# Patient Record
Sex: MALE | Race: WHITE | Employment: FULL TIME | URBAN - METROPOLITAN AREA
[De-identification: names, ages, dates, MRNs, and addresses within clinical notes are randomized per-mention and may not be internally consistent; named-entity substitution may affect disease eponyms.]

---

## 2017-01-02 RX ORDER — NEBIVOLOL HYDROCHLORIDE 2.5 MG/1
TABLET ORAL
Qty: 90 TABLET | Refills: 1 | Status: SHIPPED | OUTPATIENT
Start: 2017-01-02 | End: 2017-10-28

## 2017-01-20 ENCOUNTER — OFFICE VISIT (OUTPATIENT)
Dept: HEMATOLOGY/ONCOLOGY | Facility: HOSPITAL | Age: 52
End: 2017-01-20
Attending: SPECIALIST
Payer: COMMERCIAL

## 2017-01-20 VITALS
HEART RATE: 71 BPM | BODY MASS INDEX: 23.87 KG/M2 | TEMPERATURE: 98 F | DIASTOLIC BLOOD PRESSURE: 77 MMHG | HEIGHT: 69 IN | WEIGHT: 161.19 LBS | SYSTOLIC BLOOD PRESSURE: 129 MMHG | RESPIRATION RATE: 18 BRPM

## 2017-01-20 DIAGNOSIS — C88.4 EXTRANODAL MARGINAL ZONE B-CELL LYMPHOMA OF MUCOSA-ASSOCIATED LYMPHOID TISSUE (MALT) (HCC): Primary | ICD-10-CM

## 2017-01-20 DIAGNOSIS — K76.9 LESION OF LIVER: ICD-10-CM

## 2017-01-20 DIAGNOSIS — R77.8 ELEVATED TOTAL PROTEIN: ICD-10-CM

## 2017-01-20 DIAGNOSIS — F41.8 ANXIETY ABOUT HEALTH: ICD-10-CM

## 2017-01-20 DIAGNOSIS — R93.2 ABNORMAL LIVER CT: ICD-10-CM

## 2017-01-20 DIAGNOSIS — R74.8 ELEVATED ALKALINE PHOSPHATASE LEVEL: ICD-10-CM

## 2017-01-20 DIAGNOSIS — R16.0 LIVER MASSES: ICD-10-CM

## 2017-01-20 LAB
ALBUMIN SERPL-MCNC: 4.2 G/DL (ref 3.5–4.8)
ALP LIVER SERPL-CCNC: 121 U/L (ref 45–117)
ALT SERPL-CCNC: 44 U/L (ref 17–63)
AST SERPL-CCNC: 33 U/L (ref 15–41)
BASOPHILS # BLD AUTO: 0.02 X10(3) UL (ref 0–0.1)
BASOPHILS NFR BLD AUTO: 0.4 %
BILIRUB SERPL-MCNC: 0.5 MG/DL (ref 0.1–2)
BUN BLD-MCNC: 12 MG/DL (ref 8–20)
CALCIUM BLD-MCNC: 9.5 MG/DL (ref 8.3–10.3)
CHLORIDE: 103 MMOL/L (ref 101–111)
CO2: 31 MMOL/L (ref 22–32)
CREAT BLD-MCNC: 1.06 MG/DL (ref 0.7–1.3)
EOSINOPHIL # BLD AUTO: 0.18 X10(3) UL (ref 0–0.3)
EOSINOPHIL NFR BLD AUTO: 3.9 %
ERYTHROCYTE [DISTWIDTH] IN BLOOD BY AUTOMATED COUNT: 11.4 % (ref 11.5–16)
GLUCOSE BLD-MCNC: 88 MG/DL (ref 70–99)
HCT VFR BLD AUTO: 44.1 % (ref 37–53)
HGB BLD-MCNC: 16 G/DL (ref 13–17)
IMMATURE GRANULOCYTE COUNT: 0 X10(3) UL (ref 0–1)
IMMATURE GRANULOCYTE RATIO %: 0 %
LDH: 172 U/L (ref 84–249)
LYMPHOCYTES # BLD AUTO: 0.9 X10(3) UL (ref 0.9–4)
LYMPHOCYTES NFR BLD AUTO: 19.4 %
M PROTEIN MFR SERPL ELPH: 8.4 G/DL (ref 6.1–8.3)
MCH RBC QN AUTO: 31.7 PG (ref 27–33.2)
MCHC RBC AUTO-ENTMCNC: 36.3 G/DL (ref 31–37)
MCV RBC AUTO: 87.5 FL (ref 80–99)
MONOCYTES # BLD AUTO: 0.65 X10(3) UL (ref 0.1–0.6)
MONOCYTES NFR BLD AUTO: 14 %
NEUTROPHIL ABS PRELIM: 2.9 X10 (3) UL (ref 1.3–6.7)
NEUTROPHILS # BLD AUTO: 2.9 X10(3) UL (ref 1.3–6.7)
NEUTROPHILS NFR BLD AUTO: 62.3 %
PLATELET # BLD AUTO: 222 10(3)UL (ref 150–450)
POTASSIUM SERPL-SCNC: 4.1 MMOL/L (ref 3.6–5.1)
RBC # BLD AUTO: 5.04 X10(6)UL (ref 4.3–5.7)
RED CELL DISTRIBUTION WIDTH-SD: 36.3 FL (ref 35.1–46.3)
SODIUM SERPL-SCNC: 139 MMOL/L (ref 136–144)
WBC # BLD AUTO: 4.7 X10(3) UL (ref 4–13)

## 2017-01-20 PROCEDURE — 99215 OFFICE O/P EST HI 40 MIN: CPT | Performed by: SPECIALIST

## 2017-01-20 NOTE — PROGRESS NOTES
Patient is here for follow up with Bud Almanza for MALT Lymphoma. Patient denies pain. Stated concerned he might have felt a lump on his right check - concerned. Medication list and medical history were reviewed and updated.     Education Record    Learner:

## 2017-01-22 PROBLEM — R74.8 ELEVATED ALKALINE PHOSPHATASE LEVEL: Status: ACTIVE | Noted: 2017-01-22

## 2017-01-22 PROBLEM — R45.89 ANXIETY ABOUT HEALTH: Status: ACTIVE | Noted: 2017-01-22

## 2017-01-22 PROBLEM — F41.8 ANXIETY ABOUT HEALTH: Status: ACTIVE | Noted: 2017-01-22

## 2017-01-22 PROBLEM — R93.2 ABNORMAL LIVER CT: Status: ACTIVE | Noted: 2017-01-22

## 2017-01-22 PROBLEM — R77.8 ELEVATED TOTAL PROTEIN: Status: ACTIVE | Noted: 2017-01-22

## 2017-01-22 NOTE — PROGRESS NOTES
[facility] Progress Note      Patient Name: Keiry Siddiqui   YOB: 1965  Medical Record Number: [id number]  Attending Physician: Violetta Garcia M.D.      Date of Visit: 1/20/2017       Chief Complaint  Extranodal marginal zone B History (historical data, reviewed)  Denies tobacco and illicit drug use; uses alcohol socially.        Current Medications     BYSTOLIC 2.5 MG Oral Tab TAKE 1 TABLET BY MOUTH EVERY DAY Disp: 90 tablet Rfl: 1   Nebivolol HCl 2.5 MG Oral Tab Take 2.5 mg by m extremity edema bilaterally. Integumentary Skin is warm and dry. Neurologic Alert and oriented x 3; motor and sensory grossly intact. Psychiatric Mood and affect appropriate; coherent speech; verbalizes understanding of our discussions today.     Parish Hurtado alkaline phosphatase and total protein: Findings are ultimately non-specific. However, CT imaging 09/2016 did show multiple hypodensities. Recommend repeat imaging to confirm stability of these benign appearing abnormalities.  If CT is stable, will repeat l

## 2017-01-25 ENCOUNTER — HOSPITAL ENCOUNTER (OUTPATIENT)
Dept: CT IMAGING | Age: 52
Discharge: HOME | End: 2017-01-25
Attending: SPECIALIST
Payer: COMMERCIAL

## 2017-01-25 DIAGNOSIS — R16.0 LIVER MASSES: ICD-10-CM

## 2017-01-25 DIAGNOSIS — C88.4 EXTRANODAL MARGINAL ZONE B-CELL LYMPHOMA OF MUCOSA-ASSOCIATED LYMPHOID TISSUE (MALT) (HCC): ICD-10-CM

## 2017-01-25 PROCEDURE — 74178 CT ABD&PLV WO CNTR FLWD CNTR: CPT

## 2017-02-10 ENCOUNTER — OFFICE VISIT (OUTPATIENT)
Dept: FAMILY MEDICINE CLINIC | Facility: CLINIC | Age: 52
End: 2017-02-10

## 2017-02-10 ENCOUNTER — TELEPHONE (OUTPATIENT)
Dept: FAMILY MEDICINE CLINIC | Facility: CLINIC | Age: 52
End: 2017-02-10

## 2017-02-10 VITALS
HEART RATE: 86 BPM | WEIGHT: 161 LBS | DIASTOLIC BLOOD PRESSURE: 80 MMHG | BODY MASS INDEX: 23.85 KG/M2 | OXYGEN SATURATION: 98 % | RESPIRATION RATE: 16 BRPM | SYSTOLIC BLOOD PRESSURE: 128 MMHG | HEIGHT: 69 IN | TEMPERATURE: 99 F

## 2017-02-10 DIAGNOSIS — J01.00 ACUTE NON-RECURRENT MAXILLARY SINUSITIS: Primary | ICD-10-CM

## 2017-02-10 DIAGNOSIS — Z12.11 SCREENING FOR COLON CANCER: ICD-10-CM

## 2017-02-10 PROCEDURE — 99213 OFFICE O/P EST LOW 20 MIN: CPT | Performed by: PHYSICIAN ASSISTANT

## 2017-02-10 RX ORDER — AZITHROMYCIN 250 MG/1
TABLET, FILM COATED ORAL
Qty: 6 TABLET | Refills: 0 | Status: SHIPPED | OUTPATIENT
Start: 2017-02-10 | End: 2017-05-18 | Stop reason: ALTCHOICE

## 2017-02-10 RX ORDER — LORATADINE AND PSEUDOEPHEDRINE 10; 240 MG/1; MG/1
1 TABLET, EXTENDED RELEASE ORAL DAILY
Qty: 90 TABLET | Refills: 0 | Status: SHIPPED | OUTPATIENT
Start: 2017-02-10 | End: 2017-05-11

## 2017-02-10 NOTE — PROGRESS NOTES
CHIEF COMPLAINT:   Patient presents with:  Cough: Pt c/o cough, nasal congestion and sinus pressure X 1 week         HPI:   Jason Ball is a 49year old male who presents for congestion for one week. He admits to runny nose and facial pressure.  Gene Martinez pink and non-inflamed. No erythema of the throat. PND noted. No tonsillar enlargement or exudates   NECK: supple, non-tender. LUNGS: Normal respiratory rate. Normal effort. Dry cough. no wheezing. No rales or crackles. . No decreased BS.    CARDIO: RRR w

## 2017-02-10 NOTE — TELEPHONE ENCOUNTER
I spoke to patient who states he is at [facility] wanting to  his prescriptions. States all meds were sent to [facility] initially, but now he has them at [facility]. He is missing his claritin D and a cough medication.  Advised that Karine's note does not

## 2017-02-13 ENCOUNTER — APPOINTMENT (OUTPATIENT)
Dept: HEMATOLOGY/ONCOLOGY | Facility: HOSPITAL | Age: 52
End: 2017-02-13
Attending: SPECIALIST
Payer: COMMERCIAL

## 2017-04-10 ENCOUNTER — TELEPHONE (OUTPATIENT)
Dept: FAMILY MEDICINE CLINIC | Facility: CLINIC | Age: 52
End: 2017-04-10

## 2017-04-10 NOTE — TELEPHONE ENCOUNTER
Pt notified of Pennsaid samples, pt declines samples. He states he is requesting Voltaren gel Rx, he states it works well, he scared to try something new, & he can not get to the office for samples. Please advise on Voltaren Rx.

## 2017-04-10 NOTE — TELEPHONE ENCOUNTER
Pt requesting an Rx for Voltaren gel. He states Dr. Kunal Saldivar gave him a prescription years ago when he hurt his Rt wrist, he states his wrist has been bothering him a lot with the weather changing & he is asking if you would send a refill.   I do not see pre

## 2017-04-11 NOTE — TELEPHONE ENCOUNTER
pennsaid is voltaren gel YESICA, I sent over another voltaren gel which may be what he's been given before, but I don't see a rx in the past.

## 2017-05-16 ENCOUNTER — TELEPHONE (OUTPATIENT)
Dept: HEMATOLOGY/ONCOLOGY | Facility: HOSPITAL | Age: 52
End: 2017-05-16

## 2017-05-17 NOTE — TELEPHONE ENCOUNTER
He's supposed to follow up every 6 months. He was last seen in January so he should come in July.               ----- Message -----          From: Valerie Sanchez RN          Sent: 5/16/2017   1:32 PM            To:  MD Tammy Ragland

## 2017-05-18 ENCOUNTER — TELEPHONE (OUTPATIENT)
Dept: FAMILY MEDICINE CLINIC | Facility: CLINIC | Age: 52
End: 2017-05-18

## 2017-05-18 NOTE — PROGRESS NOTES
Elver Duncan is a 47year old male. Patient presents with: Anxiety: Pt wants to discuss anxiety medications      HPI:   Pt complains of symptoms of anxiety.  He has been having increased anxiety over the past year since diagnosed with non hodgkin' (36.9 °C) (Oral)  Resp 16  Wt 158 lb  SpO2 98%  GENERAL: well developed, well nourished,in no apparent distress  SKIN: no rashes,no suspicious lesions  HEENT: atraumatic, normocephalic  NECK: supple  LUNGS: clear to auscultation  CARDIO: RRR without murmur

## 2017-05-19 ENCOUNTER — TELEPHONE (OUTPATIENT)
Dept: FAMILY MEDICINE CLINIC | Facility: CLINIC | Age: 52
End: 2017-05-19

## 2017-05-19 RX ORDER — CLONAZEPAM 0.5 MG/1
0.25 TABLET ORAL 2 TIMES DAILY PRN
Qty: 30 TABLET | Refills: 0 | COMMUNITY
Start: 2017-05-19 | End: 2017-06-19

## 2017-05-19 NOTE — TELEPHONE ENCOUNTER
Pt taking clonazePAM 0.25 MG Oral Tablet Dispersible which dissolves on the tongue. Pt states he does not produce saliva so unable to take as directed. Patient had panic attack this morning and had to drink water and swallow the pill whole.  Patient is conc

## 2017-05-19 NOTE — TELEPHONE ENCOUNTER
Spoke to pharmacist at [facility]. Clonazepam does not come in 0.25 mg tabs. Smallest dose is 0.5 mg. Phoned in script for clonazepam 0.5 mg with directions to take 1/2 tablet po bid prn for anxiety #30 no refills.  Pharmacy will contact patient that script

## 2017-06-09 RX ORDER — LORATADINE AND PSEUDOEPHEDRINE SULFATE 10; 240 MG/1; MG/1
TABLET, EXTENDED RELEASE ORAL
Qty: 90 TABLET | Refills: 0 | Status: SHIPPED | OUTPATIENT
Start: 2017-06-09 | End: 2017-06-09

## 2017-06-09 RX ORDER — LORATADINE AND PSEUDOEPHEDRINE 10; 240 MG/1; MG/1
1 TABLET, EXTENDED RELEASE ORAL
Qty: 90 TABLET | Refills: 0 | Status: SHIPPED
Start: 2017-06-09 | End: 2017-09-08

## 2017-06-14 RX ORDER — ESCITALOPRAM OXALATE 10 MG/1
TABLET ORAL
Qty: 30 TABLET | Refills: 0 | Status: SHIPPED | OUTPATIENT
Start: 2017-06-14 | End: 2017-07-10 | Stop reason: DRUGHIGH

## 2017-06-14 NOTE — TELEPHONE ENCOUNTER
LOV: 5/18/17    LF: 5/18/17    Pt was to follow up in a month.    Please approve or deny rx refill request.  Thank you

## 2017-06-15 ENCOUNTER — TELEPHONE (OUTPATIENT)
Dept: FAMILY MEDICINE CLINIC | Facility: CLINIC | Age: 52
End: 2017-06-15

## 2017-06-19 ENCOUNTER — LAB ENCOUNTER (OUTPATIENT)
Dept: LAB | Age: 52
End: 2017-06-19
Attending: PHYSICIAN ASSISTANT
Payer: COMMERCIAL

## 2017-06-19 DIAGNOSIS — F41.8 ANXIETY ABOUT HEALTH: ICD-10-CM

## 2017-06-19 DIAGNOSIS — R79.89 ABNORMAL LIVER FUNCTION TESTS: ICD-10-CM

## 2017-06-19 DIAGNOSIS — C88.4 EXTRANODAL MARGINAL ZONE B-CELL LYMPHOMA OF MUCOSA-ASSOCIATED LYMPHOID TISSUE (MALT) (HCC): ICD-10-CM

## 2017-06-19 PROCEDURE — 36415 COLL VENOUS BLD VENIPUNCTURE: CPT | Performed by: PHYSICIAN ASSISTANT

## 2017-06-19 PROCEDURE — 84443 ASSAY THYROID STIM HORMONE: CPT | Performed by: PHYSICIAN ASSISTANT

## 2017-06-19 PROCEDURE — 85025 COMPLETE CBC W/AUTO DIFF WBC: CPT | Performed by: PHYSICIAN ASSISTANT

## 2017-06-19 PROCEDURE — 83615 LACTATE (LD) (LDH) ENZYME: CPT | Performed by: PHYSICIAN ASSISTANT

## 2017-06-19 NOTE — PROGRESS NOTES
Tobi Vargas is a 47year old male. Patient presents with:  Medication Follow-Up      HPI:   Pt presents to follow up on anxiety. He has anxiety and panic attacks related to his health-history of lymphoma.  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

## 2017-07-10 ENCOUNTER — OFFICE VISIT (OUTPATIENT)
Dept: HEMATOLOGY/ONCOLOGY | Facility: HOSPITAL | Age: 52
End: 2017-07-10
Attending: SPECIALIST
Payer: COMMERCIAL

## 2017-07-10 VITALS
HEART RATE: 79 BPM | TEMPERATURE: 98 F | WEIGHT: 158.38 LBS | BODY MASS INDEX: 23.46 KG/M2 | SYSTOLIC BLOOD PRESSURE: 117 MMHG | HEIGHT: 69.02 IN | RESPIRATION RATE: 18 BRPM | DIASTOLIC BLOOD PRESSURE: 75 MMHG | OXYGEN SATURATION: 98 %

## 2017-07-10 DIAGNOSIS — C88.4 EXTRANODAL MARGINAL ZONE B-CELL LYMPHOMA OF MUCOSA-ASSOCIATED LYMPHOID TISSUE (MALT) (HCC): ICD-10-CM

## 2017-07-10 PROCEDURE — 99213 OFFICE O/P EST LOW 20 MIN: CPT | Performed by: SPECIALIST

## 2017-07-10 NOTE — PROGRESS NOTES
Patient is here today for follow up with Dr. Yenny Choi for [address]. Patient denies pain. Medication list, medical history and toxicities were reviewed and updated.      Education Record    Learner:  Patient and Spouse    Disease / Diagnosis: MALT lymph

## 2017-07-11 RX ORDER — ESCITALOPRAM OXALATE 10 MG/1
TABLET ORAL
Qty: 30 TABLET | Refills: 0 | Status: SHIPPED | OUTPATIENT
Start: 2017-07-11 | End: 2017-08-02 | Stop reason: DRUGHIGH

## 2017-07-24 DIAGNOSIS — F41.8 ANXIETY ABOUT HEALTH: ICD-10-CM

## 2017-07-24 RX ORDER — ESCITALOPRAM OXALATE 20 MG/1
TABLET ORAL
Qty: 30 TABLET | Refills: 0 | OUTPATIENT
Start: 2017-07-24

## 2017-08-02 ENCOUNTER — TELEPHONE (OUTPATIENT)
Dept: FAMILY MEDICINE CLINIC | Facility: CLINIC | Age: 52
End: 2017-08-02

## 2017-08-02 DIAGNOSIS — F41.8 ANXIETY ABOUT HEALTH: ICD-10-CM

## 2017-08-02 RX ORDER — ESCITALOPRAM OXALATE 20 MG/1
TABLET ORAL
Qty: 30 TABLET | Refills: 0 | Status: CANCELLED | OUTPATIENT
Start: 2017-08-02

## 2017-08-02 RX ORDER — ESCITALOPRAM OXALATE 20 MG/1
20 TABLET ORAL DAILY
Qty: 30 TABLET | Refills: 0 | Status: SHIPPED | OUTPATIENT
Start: 2017-08-02 | End: 2017-08-30

## 2017-08-02 NOTE — TELEPHONE ENCOUNTER
Patient says that he asked for this medication Escitalopram from pharmacy on 8/1/17 but looks like we just received it in 5 minutes ago.  He says he out and need this today for his anxiety

## 2017-08-30 DIAGNOSIS — F41.8 ANXIETY ABOUT HEALTH: ICD-10-CM

## 2017-08-30 RX ORDER — ESCITALOPRAM OXALATE 20 MG/1
TABLET ORAL
Qty: 30 TABLET | Refills: 0 | Status: SHIPPED | OUTPATIENT
Start: 2017-08-30 | End: 2017-09-29

## 2017-08-30 NOTE — TELEPHONE ENCOUNTER
ESCITALOPRAM 20MG TABLETS  In chart as: ESCITALOPRAM 20 MG Oral Tab  TAKE 1 TABLET(20 MG) BY MOUTH DAILY       Disp: 30 tablet Refills: 0    Class: Normal Start: 8/30/2017   For: Anxiety about health  Originally ordered: 2 months ago by Kadeem Bull,

## 2017-09-08 RX ORDER — LORATADINE AND PSEUDOEPHEDRINE SULFATE 10; 240 MG/1; MG/1
TABLET, EXTENDED RELEASE ORAL
Qty: 90 TABLET | Refills: 0 | Status: SHIPPED
Start: 2017-09-08 | End: 2018-01-23

## 2017-09-11 ENCOUNTER — TELEPHONE (OUTPATIENT)
Dept: FAMILY MEDICINE CLINIC | Facility: CLINIC | Age: 52
End: 2017-09-11

## 2017-09-11 RX ORDER — CLONAZEPAM 0.5 MG/1
0.5 TABLET ORAL 2 TIMES DAILY PRN
Qty: 60 TABLET | Refills: 2 | Status: SHIPPED
Start: 2017-09-11 | End: 2017-10-11

## 2017-09-11 NOTE — TELEPHONE ENCOUNTER
Please read below message. Please advise on what dosage pt should be taking for his Escitalopram, I see Rx was to be 20mg QD. LOV 6/19. Please advise on dosage pt should be taking.

## 2017-09-11 NOTE — TELEPHONE ENCOUNTER
Patient was taking ESCITALOPRAM  Twice a day before and now the dosage was change to only once a day. Patient would like to know why his dosage has change. He would like to go back to the original dosage, twice a day.

## 2017-09-11 NOTE — TELEPHONE ENCOUNTER
Patient should only be taking 20 mg once daily.  At last OV dose was increased from 10 to 20. 20 mg is a max dose

## 2017-09-11 NOTE — TELEPHONE ENCOUNTER
The entire original message for the [place] was taken wrong. Pt is asking for a refill on clonazepam 0.5mg bid. Last RX was 6/19/17 # 60 with 0 refills. Pt has been out for a few days and is now very anxious.   He states it helps him sleep at night and since

## 2017-09-29 DIAGNOSIS — F41.8 ANXIETY ABOUT HEALTH: ICD-10-CM

## 2017-09-29 RX ORDER — ESCITALOPRAM OXALATE 20 MG/1
TABLET ORAL
Qty: 30 TABLET | Refills: 0 | Status: SHIPPED | OUTPATIENT
Start: 2017-09-29 | End: 2017-10-28

## 2017-09-29 NOTE — TELEPHONE ENCOUNTER
ESCITALOPRAM 20MG TABLETS  Will file in chart as: ESCITALOPRAM 20 MG Oral Tab  TAKE 1 TABLET(20 MG) BY MOUTH DAILY       Disp: 30 tablet Refills: 0    Class: Normal Start: 9/29/2017   For: Anxiety about health  Originally ordered: 3 months ago by Joseph Briceno

## 2017-10-28 DIAGNOSIS — F41.8 ANXIETY ABOUT HEALTH: ICD-10-CM

## 2017-10-28 RX ORDER — ESCITALOPRAM OXALATE 20 MG/1
TABLET ORAL
Qty: 30 TABLET | Refills: 0 | Status: SHIPPED | OUTPATIENT
Start: 2017-10-28 | End: 2017-11-26

## 2017-10-28 RX ORDER — NEBIVOLOL HYDROCHLORIDE 2.5 MG/1
TABLET ORAL
Qty: 90 TABLET | Refills: 0 | Status: SHIPPED | OUTPATIENT
Start: 2017-10-28 | End: 2018-02-04

## 2017-10-28 NOTE — TELEPHONE ENCOUNTER
Medication(s) to Refill:   Pending Prescriptions Disp Refills    ESCITALOPRAM 20 MG Oral Tab [Pharmacy Med Name: ESCITALOPRAM 20MG TABLETS] 30 tablet 0     Sig: TAKE 1 TABLET(20 MG) BY MOUTH DAILY           Last Time Medication was Filled: 09/29/2017     L

## 2017-11-26 DIAGNOSIS — F41.8 ANXIETY ABOUT HEALTH: ICD-10-CM

## 2017-11-27 RX ORDER — ESCITALOPRAM OXALATE 20 MG/1
TABLET ORAL
Qty: 30 TABLET | Refills: 0 | Status: SHIPPED | OUTPATIENT
Start: 2017-11-27 | End: 2018-01-04

## 2017-11-27 NOTE — TELEPHONE ENCOUNTER
Medication(s) to Refill:   Pending Prescriptions Disp Refills    ESCITALOPRAM 20 MG Oral Tab [Pharmacy Med Name: ESCITALOPRAM 20MG TABLETS] 30 tablet 0     Sig: TAKE 1 TABLET(20 MG) BY MOUTH DAILY           Last Time Medication was Filled:   10/28/17    La

## 2018-01-04 DIAGNOSIS — F41.8 ANXIETY ABOUT HEALTH: ICD-10-CM

## 2018-01-04 RX ORDER — ESCITALOPRAM OXALATE 20 MG/1
TABLET ORAL
Qty: 30 TABLET | Refills: 0 | Status: SHIPPED | OUTPATIENT
Start: 2018-01-04 | End: 2018-02-04

## 2018-01-07 NOTE — PROGRESS NOTES
[facility] Progress Note      Patient Name: Pa Moya   YOB: 1965  Medical Record Number: [id number]  Attending Physician: Yasemin Roman M.D.      Date of Visit: 1/8/2018      Chief Complaint  Extranodal marginal zone B c BY MOUTH EVERY DAY Disp: 90 tablet Rfl: 0   CLARITIN-D 24 HOUR  MG Oral Tablet 24 Hr TAKE 1 TABLET BY MOUTH EVERY DAY Disp: 90 tablet Rfl: 0   Diclofenac Sodium (VOLTAREN) 1 % Transdermal Gel 1 application to affected area bid prn Disp: 100 g Rfl: 2 masses,no fluid wave; normoactive bowel sounds; no hepatosplenomegaly. Extremities  No lower extremity edema bilaterally. Integumentary  Skin is warm and dry. Neurologic  Alert and oriented x 3; motor and sensory grossly intact.   Psychiatric  Mood and

## 2018-01-08 ENCOUNTER — OFFICE VISIT (OUTPATIENT)
Dept: HEMATOLOGY/ONCOLOGY | Facility: HOSPITAL | Age: 53
End: 2018-01-08
Attending: SPECIALIST
Payer: COMMERCIAL

## 2018-01-08 VITALS
SYSTOLIC BLOOD PRESSURE: 125 MMHG | DIASTOLIC BLOOD PRESSURE: 80 MMHG | HEART RATE: 86 BPM | HEIGHT: 69.02 IN | OXYGEN SATURATION: 98 % | RESPIRATION RATE: 18 BRPM | BODY MASS INDEX: 24.35 KG/M2 | WEIGHT: 164.38 LBS | TEMPERATURE: 97 F

## 2018-01-08 DIAGNOSIS — C88.4 EXTRANODAL MARGINAL ZONE B-CELL LYMPHOMA OF MUCOSA-ASSOCIATED LYMPHOID TISSUE (MALT) (HCC): ICD-10-CM

## 2018-01-08 PROCEDURE — 99213 OFFICE O/P EST LOW 20 MIN: CPT | Performed by: SPECIALIST

## 2018-01-08 NOTE — PROGRESS NOTES
Patient is here today for follow up with Dr. Vincent Red for B Cell MALT Lymphoma. Patient denies pain. Stated has new lump behind his left ear. Feels good otherwise. Medication list and medical history were reviewed and updated.     Education Record    Learner

## 2018-01-23 ENCOUNTER — TELEPHONE (OUTPATIENT)
Dept: FAMILY MEDICINE CLINIC | Facility: CLINIC | Age: 53
End: 2018-01-23

## 2018-01-23 NOTE — TELEPHONE ENCOUNTER
Patient requesting refill on CLARITIN-D 24 HOUR  MG Oral Tablet 24 Hr be sent to his [facility]

## 2018-01-24 RX ORDER — LORATADINE AND PSEUDOEPHEDRINE SULFATE 10; 240 MG/1; MG/1
TABLET, EXTENDED RELEASE ORAL
Qty: 90 TABLET | Refills: 0 | Status: SHIPPED
Start: 2018-01-24 | End: 2018-05-03

## 2018-02-04 DIAGNOSIS — F41.8 ANXIETY ABOUT HEALTH: ICD-10-CM

## 2018-02-05 RX ORDER — ESCITALOPRAM OXALATE 20 MG/1
TABLET ORAL
Qty: 30 TABLET | Refills: 0 | Status: SHIPPED | OUTPATIENT
Start: 2018-02-05 | End: 2018-03-06

## 2018-02-05 RX ORDER — NEBIVOLOL HYDROCHLORIDE 2.5 MG/1
TABLET ORAL
Qty: 90 TABLET | Refills: 0 | Status: SHIPPED | OUTPATIENT
Start: 2018-02-05 | End: 2018-05-03

## 2018-02-05 NOTE — TELEPHONE ENCOUNTER
Medication(s) to Refill:   Pending Prescriptions Disp Refills    ESCITALOPRAM 20 MG Oral Tab [Pharmacy Med Name: ESCITALOPRAM 20MG TABLETS] 30 tablet 0     Sig: TAKE 1 TABLET(20 MG) BY MOUTH DAILY      BYSTOLIC 2.5 MG Oral Tab [Pharmacy Med Name: BYSTOLIC 6.7WU TABLETS] 90 tablet 0     Sig: TAKE 1 TABLET BY MOUTH EVERY DAY           Last Time Medication was Filled: escitalopram 0/4/2615 and Bystolic 98/53/1615    Last Office Visit with PCP:  6/19/2017    When Patient was Due Back to the Office:  (from when PCP last addressed medication)  [x] 1 month,  [] 3 months,  [] 6 months,  [] 12 months,  [] Other      Future Appointments  Date Time Provider Josh Alford   7/9/2018 11:00 AM Ja Gaspar MD [id number]        Unable to Refill per Protocol:   [x] Office visit due (90 day supply prescription extension has already been granted)   [] Blood Pressure out of range   [] Labs Overdue     [x] Other no protocol

## 2018-02-10 ENCOUNTER — OFFICE VISIT (OUTPATIENT)
Dept: FAMILY MEDICINE CLINIC | Facility: CLINIC | Age: 53
End: 2018-02-10

## 2018-02-10 VITALS
SYSTOLIC BLOOD PRESSURE: 118 MMHG | DIASTOLIC BLOOD PRESSURE: 78 MMHG | WEIGHT: 166.38 LBS | HEART RATE: 88 BPM | BODY MASS INDEX: 24.64 KG/M2 | RESPIRATION RATE: 16 BRPM | HEIGHT: 69 IN | TEMPERATURE: 98 F

## 2018-02-10 DIAGNOSIS — J01.00 ACUTE NON-RECURRENT MAXILLARY SINUSITIS: Primary | ICD-10-CM

## 2018-02-10 PROCEDURE — 99213 OFFICE O/P EST LOW 20 MIN: CPT | Performed by: NURSE PRACTITIONER

## 2018-02-10 RX ORDER — AMOXICILLIN AND CLAVULANATE POTASSIUM 875; 125 MG/1; MG/1
1 TABLET, FILM COATED ORAL 2 TIMES DAILY
Qty: 20 TABLET | Refills: 0 | Status: SHIPPED | OUTPATIENT
Start: 2018-02-10 | End: 2018-02-20

## 2018-02-10 NOTE — PROGRESS NOTES
CHIEF COMPLAINT:   Patient presents with:  Sinus Problem: poss sinus infection, some sinus pressure, yellow mucous, 2 weeks      HPI:   Waylon Wong is a 47year old male who presents for cold symptoms for  2  weeks.  Symptoms have progressed into sin No date: OTHER SURGICAL HISTORY      Comment: right shoulder arthroscopy  No date: OTHER SURGICAL HISTORY      Comment: right knee arthroscopy  4/6/2015: TYMPANOPLAS/MASTOIDEC,INTACT WALL Right      Comment: Procedure: TYMPANOMASTOIDECTOMY W/O OSSICULAR ASSESSMENT AND PLAN:   Waylon Wong is a 47year old male who presents with Sinus Problem (poss sinus infection, some sinus pressure, yellow mucous, 2 weeks).  Symptoms are consistent with:      ASSESSMENT:  Acute non-recurrent maxillary sinusitis  (p Sinuses are air-filled spaces in the skull behind the face. They are kept moist and clean by a lining of mucosa. Things such as pollen, smoke, and chemical fumes can irritate the mucosa. It can then swell up.  As a response to irritation, the mucosa makes m © 7371-7408 [employer] [address]. [address] [facility] [place], [facility] [place]. All rights reserved. This information is not intended as a substitute for professional medical care. Always follow your healthcare professional's instructions.             The

## 2018-03-06 DIAGNOSIS — F41.8 ANXIETY ABOUT HEALTH: ICD-10-CM

## 2018-03-06 RX ORDER — ESCITALOPRAM OXALATE 20 MG/1
TABLET ORAL
Qty: 30 TABLET | Refills: 0 | Status: SHIPPED | OUTPATIENT
Start: 2018-03-06 | End: 2018-04-10

## 2018-03-16 ENCOUNTER — OFFICE VISIT (OUTPATIENT)
Dept: FAMILY MEDICINE CLINIC | Facility: CLINIC | Age: 53
End: 2018-03-16

## 2018-03-16 VITALS
SYSTOLIC BLOOD PRESSURE: 122 MMHG | HEIGHT: 69 IN | DIASTOLIC BLOOD PRESSURE: 80 MMHG | WEIGHT: 164 LBS | RESPIRATION RATE: 16 BRPM | HEART RATE: 68 BPM | OXYGEN SATURATION: 98 % | BODY MASS INDEX: 24.29 KG/M2 | TEMPERATURE: 98 F

## 2018-03-16 DIAGNOSIS — J01.00 ACUTE MAXILLARY SINUSITIS, RECURRENCE NOT SPECIFIED: Primary | ICD-10-CM

## 2018-03-16 PROCEDURE — 99213 OFFICE O/P EST LOW 20 MIN: CPT | Performed by: NURSE PRACTITIONER

## 2018-03-16 RX ORDER — DOXYCYCLINE HYCLATE 100 MG
100 TABLET ORAL 2 TIMES DAILY
Qty: 20 TABLET | Refills: 0 | Status: SHIPPED | OUTPATIENT
Start: 2018-03-16 | End: 2018-03-26

## 2018-03-16 NOTE — PROGRESS NOTES
CHIEF COMPLAINT:   Patient presents with:  Nasal Congestion: sinus pressure x 2 weeks      HPI:   Rambo Bucio is a 47year old male who presents for cold symptoms for over 1 month.   Took augmentin 1 month ago for similar symptoms, symptoms slowly im Comment: Procedure: TYMPANOMASTOIDECTOMY W/O OSSICULAR                CHAIN RECONSTRUCTION;  Surgeon: Svitlana Lazo MD;  Location: [address] [place] [address]   Family History   Problem Relation Age of Onset   • Cancer Mother      breast c Acute maxillary sinusitis, recurrence not specified  (primary encounter diagnosis)      PLAN: Meds as below.     Comfort care instructions as listed in Patient Instructions  Advised pt if symptoms persist or recur he should f/u with PCP for recheck, pt agre Your doctor may prescribe medications to help treat your sinusitis. If you have an infection, antibiotics can help clear it up. If you are prescribed antibiotics, take all pills on schedule until they are gone, even if you feel better.  Decongestants help r

## 2018-03-23 ENCOUNTER — TELEPHONE (OUTPATIENT)
Dept: FAMILY MEDICINE CLINIC | Facility: CLINIC | Age: 53
End: 2018-03-23

## 2018-04-10 DIAGNOSIS — F41.8 ANXIETY ABOUT HEALTH: ICD-10-CM

## 2018-04-10 RX ORDER — ESCITALOPRAM OXALATE 20 MG/1
TABLET ORAL
Qty: 15 TABLET | Refills: 0 | Status: SHIPPED | OUTPATIENT
Start: 2018-04-10 | End: 2018-06-28

## 2018-04-13 RX ORDER — CLONAZEPAM 0.5 MG/1
TABLET ORAL
Qty: 60 TABLET | Refills: 0
Start: 2018-04-13

## 2018-04-13 NOTE — TELEPHONE ENCOUNTER
Patient has an appointment today(4/13/2018 at 240pm), will address refill at appointment        Last Time Medication was Filled:  9/11/2017 x 3months      Last Office Visit with PCP: 6/19/2017      Future Appointments:  Future Appointments  Date

## 2018-04-13 NOTE — PROGRESS NOTES
[facility] Office Note  Chief Complaint:   Patient presents with: Follow - Up      HPI:   This is a 47year old male coming in for  HPI  Pt is here for a recheck of anxiety. Has been tolerating the meds well.  Denies any side effe 30 tablet Rfl: 1   ESCITALOPRAM 20 MG Oral Tab TAKE 1 TABLET(20 MG) BY MOUTH DAILY Disp: 15 tablet Rfl: 0   BYSTOLIC 2.5 MG Oral Tab TAKE 1 TABLET BY MOUTH EVERY DAY Disp: 90 tablet Rfl: 0   CLARITIN-D 24 HOUR  MG Oral Tablet 24 Hr TAKE 1 TABLET BY M Patient understands risks that anti-depressants as well as anti-anxiety agents have been shown to paradoxically worsen anxiety and depression and trigger suicidal thoughts.  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

## 2018-04-27 NOTE — PROGRESS NOTES
[facility] Progress Note      Patient Name: Deena Elizabeth   YOB: 1965  Medical Record Number: [id number]  Attending Physician: Enrique Noland M.D.      Date of Visit: 7/10/2017      Chief Complaint  Extranodal marginal zone B Oral Tab Take 1 tablet (0.5 mg total) by mouth 2 (two) times daily. Disp: 60 tablet Rfl: 0   Loratadine-Pseudoephedrine ER (CLARITIN-D 24 HOUR)  MG Oral Tablet 24 Hr Take 1 tablet by mouth once daily.  Disp: 90 tablet Rfl: 0   Diclofenac Sodium (VOLTA distress; lungs clear to auscultation bilaterally. Cardiovascular Regular rate and rhythm; normal S1S2. Abdomen Non-tender; non-distended; no masses,no fluid wave; normoactive bowel sounds; no hepatosplenomegaly.   Extremities No lower extremity edema jerry 0.10 x10(3) uL   Immature Granulocyte Absolute 0.01 0.00 - 1.00 x10(3) uL   Neutrophil % 67.7 %   Lymphocyte % 18.7 %   Monocyte % 11.4 %   Eosinophil % 1.8 %   Basophil % 0.2 %   Immature Granulocyte % 0.2 %     Impression and Plan   1.    MALT lymphoma: T No

## 2018-05-03 RX ORDER — LORATADINE AND PSEUDOEPHEDRINE 10; 240 MG/1; MG/1
1 TABLET, EXTENDED RELEASE ORAL
Qty: 90 TABLET | Refills: 0 | COMMUNITY
Start: 2018-05-03 | End: 2018-08-12

## 2018-05-03 RX ORDER — LORATADINE AND PSEUDOEPHEDRINE SULFATE 10; 240 MG/1; MG/1
TABLET, EXTENDED RELEASE ORAL
Qty: 90 TABLET | Refills: 0 | Status: SHIPPED | OUTPATIENT
Start: 2018-05-03 | End: 2018-05-03

## 2018-05-03 RX ORDER — NEBIVOLOL HYDROCHLORIDE 2.5 MG/1
TABLET ORAL
Qty: 90 TABLET | Refills: 0 | Status: SHIPPED | OUTPATIENT
Start: 2018-05-03 | End: 2018-08-25

## 2018-05-03 NOTE — TELEPHONE ENCOUNTER
Medication(s) to Refill:   Pending Prescriptions Disp Refills    CLARITIN-D 24 HOUR  MG Oral Tablet 24 Hr [Pharmacy Med Name: CLARITIN-D 24 HOUR TABLETS (NEW)] 90 tablet 0     Sig: TAKE ONE TABLET BY MOUTH EVERY DAY             Reason for Medication

## 2018-06-28 ENCOUNTER — OFFICE VISIT (OUTPATIENT)
Dept: HEMATOLOGY/ONCOLOGY | Facility: HOSPITAL | Age: 53
End: 2018-06-28
Attending: SPECIALIST
Payer: COMMERCIAL

## 2018-06-28 VITALS
BODY MASS INDEX: 24.09 KG/M2 | DIASTOLIC BLOOD PRESSURE: 70 MMHG | HEART RATE: 92 BPM | RESPIRATION RATE: 18 BRPM | HEIGHT: 69.02 IN | TEMPERATURE: 97 F | SYSTOLIC BLOOD PRESSURE: 127 MMHG | OXYGEN SATURATION: 98 % | WEIGHT: 162.63 LBS

## 2018-06-28 DIAGNOSIS — C88.4 EXTRANODAL MARGINAL ZONE B-CELL LYMPHOMA OF MUCOSA-ASSOCIATED LYMPHOID TISSUE (MALT) (HCC): ICD-10-CM

## 2018-06-28 DIAGNOSIS — R53.83 FATIGUE, UNSPECIFIED TYPE: Primary | ICD-10-CM

## 2018-06-28 PROCEDURE — 99213 OFFICE O/P EST LOW 20 MIN: CPT | Performed by: SPECIALIST

## 2018-06-28 NOTE — PROGRESS NOTES
[facility] Progress Note      Patient Name: Deena Elizabeth   YOB: 1965  Medical Record Number: [id number]  Attending Physician: Enrique Noland M.D.      Date of Visit: 6/28/2018      Chief Complaint  Extranodal marginal zone B BYSTOLIC 2.5 MG Oral Tab TAKE 1 TABLET BY MOUTH EVERY DAY Disp: 90 tablet Rfl: 0   Loratadine-Pseudoephedrine ER (CLARITIN-D 24 HOUR)  MG Oral Tablet 24 Hr Take 1 tablet by mouth once daily.  Disp: 90 tablet Rfl: 0   escitalopram 20 MG Oral Tab Ta masses. Hematologic/Lymphatic No cervical, supraclavicular, axillary or inguinal lymphadenopathy; no petechiae or purpura. Respiratory Normal effort; no respiratory distress; lungs clear to auscultation bilaterally.   Cardiovascular Regular rate and rhyth Monocyte Absolute 0.53 0.10 - 1.00 x10(3) uL   Eosinophil Absolute 0.13 0.00 - 0.30 x10(3) uL   Basophil Absolute 0.01 0.00 - 0.10 x10(3) uL   Immature Granulocyte Absolute 0.01 0.00 - 1.00 x10(3) uL   Neutrophil % 64.5 %   Lymphocyte % 21.5 %   Monocyte

## 2018-06-29 ENCOUNTER — APPOINTMENT (OUTPATIENT)
Dept: HEMATOLOGY/ONCOLOGY | Facility: HOSPITAL | Age: 53
End: 2018-06-29
Attending: SPECIALIST
Payer: COMMERCIAL

## 2018-07-04 DIAGNOSIS — F41.8 ANXIETY ABOUT HEALTH: ICD-10-CM

## 2018-07-05 RX ORDER — CLONAZEPAM 0.5 MG/1
TABLET ORAL
Qty: 30 TABLET | Refills: 0 | Status: SHIPPED
Start: 2018-07-05 | End: 2018-09-04

## 2018-07-09 ENCOUNTER — APPOINTMENT (OUTPATIENT)
Dept: HEMATOLOGY/ONCOLOGY | Facility: HOSPITAL | Age: 53
End: 2018-07-09
Attending: SPECIALIST
Payer: COMMERCIAL

## 2018-08-13 RX ORDER — LORATADINE AND PSEUDOEPHEDRINE SULFATE 10; 240 MG/1; MG/1
TABLET, EXTENDED RELEASE ORAL
Qty: 90 TABLET | Refills: 0 | Status: SHIPPED | OUTPATIENT
Start: 2018-08-13 | End: 2018-12-11

## 2018-08-13 NOTE — TELEPHONE ENCOUNTER
Medication(s) to Refill:   Pending Prescriptions Disp Refills    CLARITIN-D 24 HOUR  MG Oral Tablet 24 Hr [Pharmacy Med Name: CLARITIN-D 24 HOUR TABLETS (NEW)] 90 tablet 0     Sig: TAKE 1 TABLET BY MOUTH EVERY DAY             Reason for Medication Re

## 2018-08-21 ENCOUNTER — TELEPHONE (OUTPATIENT)
Dept: FAMILY MEDICINE CLINIC | Facility: CLINIC | Age: 53
End: 2018-08-21

## 2018-08-25 RX ORDER — NEBIVOLOL HYDROCHLORIDE 2.5 MG/1
TABLET ORAL
Qty: 90 TABLET | Refills: 0 | Status: SHIPPED | OUTPATIENT
Start: 2018-08-25 | End: 2018-11-25

## 2018-09-04 DIAGNOSIS — F41.8 ANXIETY ABOUT HEALTH: ICD-10-CM

## 2018-09-04 RX ORDER — CLONAZEPAM 0.5 MG/1
TABLET ORAL
Qty: 30 TABLET | Refills: 0 | Status: SHIPPED
Start: 2018-09-04 | End: 2018-10-07

## 2018-10-07 DIAGNOSIS — F41.8 ANXIETY ABOUT HEALTH: ICD-10-CM

## 2018-10-08 RX ORDER — CLONAZEPAM 0.5 MG/1
TABLET ORAL
Qty: 30 TABLET | Refills: 0 | Status: SHIPPED
Start: 2018-10-08 | End: 2018-11-12

## 2018-10-08 NOTE — TELEPHONE ENCOUNTER
Medication(s) to Refill:   Requested Prescriptions     Pending Prescriptions Disp Refills   • CLONAZEPAM 0.5 MG Oral Tab [Pharmacy Med Name: CLONAZEPAM 0.5MG TABLETS] 30 tablet 0     Sig: TAKE 1 TABLET BY MOUTH EVERY NIGHT AT BEDTIME AS NEEDED FOR ANXIETY

## 2018-10-20 DIAGNOSIS — F41.8 ANXIETY ABOUT HEALTH: ICD-10-CM

## 2018-10-22 RX ORDER — ESCITALOPRAM OXALATE 20 MG/1
TABLET ORAL
Qty: 90 TABLET | Refills: 0 | Status: SHIPPED | OUTPATIENT
Start: 2018-10-22 | End: 2018-12-11 | Stop reason: DRUGHIGH

## 2018-10-22 NOTE — TELEPHONE ENCOUNTER
Medication(s) to Refill:   Requested Prescriptions     Pending Prescriptions Disp Refills   • ESCITALOPRAM 20 MG Oral Tab [Pharmacy Med Name: ESCITALOPRAM 20MG TABLETS] 90 tablet 0     Sig: TAKE 1 TABLET(20 MG) BY MOUTH DAILY     Due for office visit    Re

## 2018-11-09 NOTE — TELEPHONE ENCOUNTER
Pt requesting refill of Diclofenac Sodium (VOLTAREN) 1 % Transdermal Gel Si application to affected area bid prn    LOV: 18, LF: 4/10/17    Pended rx. Pls approve or deny.

## 2018-11-09 NOTE — TELEPHONE ENCOUNTER
Diclofenac Sodium (VOLTAREN) 1 % Transdermal Gel Si application to affected area bid prn    Patient is needing a refill on this medication sent to [facility] in [place].

## 2018-11-12 DIAGNOSIS — F41.8 ANXIETY ABOUT HEALTH: ICD-10-CM

## 2018-11-12 RX ORDER — CLONAZEPAM 0.5 MG/1
TABLET ORAL
Qty: 30 TABLET | Refills: 0 | Status: SHIPPED
Start: 2018-11-12 | End: 2018-12-11

## 2018-11-12 NOTE — TELEPHONE ENCOUNTER
Medication(s) to Refill:   Requested Prescriptions     Pending Prescriptions Disp Refills   • CLONAZEPAM 0.5 MG Oral Tab [Pharmacy Med Name: CLONAZEPAM 0.5MG TABLETS] 30 tablet 0     Sig: TAKE ONE TABLET BY MOUTH EVERY NIGHT AT BEDTIME AS NEEDED ANXIETY

## 2018-11-26 RX ORDER — NEBIVOLOL HYDROCHLORIDE 2.5 MG/1
TABLET ORAL
Qty: 90 TABLET | Refills: 0 | Status: SHIPPED | OUTPATIENT
Start: 2018-11-26 | End: 2019-03-05

## 2018-11-26 NOTE — TELEPHONE ENCOUNTER
Medication(s) to Refill:   Requested Prescriptions     Pending Prescriptions Disp Refills   • BYSTOLIC 2.5 MG Oral Tab [Pharmacy Med Name: BYSTOLIC 0.0TT TABLETS] 90 tablet 0     Sig: TAKE 1 TABLET BY MOUTH EVERY DAY         Last Time Medication was Filled

## 2018-12-06 ENCOUNTER — OFFICE VISIT (OUTPATIENT)
Dept: HEMATOLOGY/ONCOLOGY | Facility: HOSPITAL | Age: 53
End: 2018-12-06
Attending: STUDENT IN AN ORGANIZED HEALTH CARE EDUCATION/TRAINING PROGRAM
Payer: COMMERCIAL

## 2018-12-06 VITALS
BODY MASS INDEX: 24.73 KG/M2 | HEIGHT: 69.02 IN | WEIGHT: 167 LBS | OXYGEN SATURATION: 98 % | DIASTOLIC BLOOD PRESSURE: 68 MMHG | TEMPERATURE: 97 F | SYSTOLIC BLOOD PRESSURE: 122 MMHG | RESPIRATION RATE: 16 BRPM | HEART RATE: 61 BPM

## 2018-12-06 DIAGNOSIS — C88.4 EXTRANODAL MARGINAL ZONE B-CELL LYMPHOMA OF MUCOSA-ASSOCIATED LYMPHOID TISSUE (MALT) (HCC): ICD-10-CM

## 2018-12-06 PROCEDURE — 99213 OFFICE O/P EST LOW 20 MIN: CPT | Performed by: SPECIALIST

## 2018-12-06 NOTE — PROGRESS NOTES
[facility] Progress Note      Patient Name: Deena Elizabeth   YOB: 1965  Medical Record Number: [id number]  Attending Physician: Enrique Noland M.D.      Date of Visit: 12/6/2018      Chief Complaint  Extranodal marginal zone B Disp: 90 tablet Rfl: 0   CLONAZEPAM 0.5 MG Oral Tab TAKE ONE TABLET BY MOUTH EVERY NIGHT AT BEDTIME AS NEEDED ANXIETY Disp: 30 tablet Rfl: 0   Diclofenac Sodium (VOLTAREN) 1 % Transdermal Gel 1 application to affected area bid prn Disp: 100 g Rfl: 0   ESCI petechiae or purpura. Respiratory  Normal effort; no respiratory distress; lungs clear to auscultation bilaterally. Cardiovascular  Regular rate and rhythm; normal S1S2.   Abdomen  Non-tender; non-distended; no masses,no fluid wave; normoactive bowel soun

## 2018-12-06 NOTE — PROGRESS NOTES
Patient is here today for follow up  with Angie Amaya for MALT Lymphoma . Patient denies pain. Has a hard time sleeping at night. Medication list and medical history were reviewed and updated.      Education Record    Learner:  Patient and spouse    Disease

## 2018-12-11 NOTE — PATIENT INSTRUCTIONS
Anxiety Reaction  Anxiety is the feeling we all get when we think something bad might happen. It is a normal response to stress and usually causes only a mild reaction. When anxiety becomes more severe, it can interfere with daily life.  In some cases, yo methods that will reduce your anxiety. These include simple things like exercise, good nutrition, and adequate rest. Also, there are certain techniques that are helpful:  ? Relaxation  ? Breathing exercises  ? Visualization  ? Biofeedback  ?  Meditation  Fo

## 2018-12-11 NOTE — PROGRESS NOTES
Chief Complaint:   Patient presents with:  Medication Follow-Up    HPI:   This is a 47year old male presenting for anxiety follow up. He has a history of MALT lymphoma that was diagnosed in 2016. He developed anxiety about his diagnosis and health.  Has be Comment: 2x a month    Drug use: No    Family History:  Family History   Problem Relation Age of Onset   • Cancer Mother         breast cancer-  age 46   • Breast Cancer Mother    • ADHD Son    • Bipolar Disorder Son    • Renal Disease Brother joint swelling and joint pain. Skin: Negative for pallor, rash and wound. Allergic/Immunologic: Positive for environmental allergies. Neurological: Negative for weakness and headaches. Psychiatric/Behavioral: Negative for depressed mood.  The patien (CLARITIN-D 24 HOUR)  MG Oral Tablet 24 Hr; Take 1 tablet by mouth once daily.     3. Screening for colon cancer  -Patient given stool cards in the office  - OCCULT BLOOD, FECAL, IMMUNOASSAY

## 2018-12-14 ENCOUNTER — APPOINTMENT (OUTPATIENT)
Dept: HEMATOLOGY/ONCOLOGY | Facility: HOSPITAL | Age: 53
End: 2018-12-14
Attending: SPECIALIST
Payer: COMMERCIAL

## 2018-12-28 ENCOUNTER — APPOINTMENT (OUTPATIENT)
Dept: HEMATOLOGY/ONCOLOGY | Facility: HOSPITAL | Age: 53
End: 2018-12-28
Attending: SPECIALIST
Payer: COMMERCIAL

## 2019-01-13 DIAGNOSIS — F41.8 ANXIETY ABOUT HEALTH: ICD-10-CM

## 2019-01-14 RX ORDER — CLONAZEPAM 0.5 MG/1
TABLET ORAL
Qty: 30 TABLET | Refills: 0 | Status: SHIPPED
Start: 2019-01-14 | End: 2019-02-08

## 2019-01-14 NOTE — TELEPHONE ENCOUNTER
Medication(s) to Refill:   Requested Prescriptions     Pending Prescriptions Disp Refills   • ClonazePAM 0.5 MG Oral Tab [Pharmacy Med Name: CLONAZEPAM 0.5MG TABLETS] 30 tablet 0     Sig: TAKE ONE TABLET BY MOUTH EVERY NIGHT AT BEDTIME AS NEEDED FOR ANXIET

## 2019-01-16 ENCOUNTER — OFFICE VISIT (OUTPATIENT)
Dept: FAMILY MEDICINE CLINIC | Facility: CLINIC | Age: 54
End: 2019-01-16
Payer: COMMERCIAL

## 2019-01-16 VITALS
OXYGEN SATURATION: 98 % | DIASTOLIC BLOOD PRESSURE: 70 MMHG | RESPIRATION RATE: 18 BRPM | TEMPERATURE: 98 F | HEART RATE: 89 BPM | WEIGHT: 164.81 LBS | SYSTOLIC BLOOD PRESSURE: 102 MMHG | BODY MASS INDEX: 24 KG/M2

## 2019-01-16 DIAGNOSIS — J06.9 URI WITH COUGH AND CONGESTION: Primary | ICD-10-CM

## 2019-01-16 DIAGNOSIS — Z20.89 EXPOSURE TO PNEUMONIA: ICD-10-CM

## 2019-01-16 PROCEDURE — 99213 OFFICE O/P EST LOW 20 MIN: CPT | Performed by: NURSE PRACTITIONER

## 2019-01-16 RX ORDER — AZITHROMYCIN 250 MG/1
TABLET, FILM COATED ORAL
Qty: 6 TABLET | Refills: 0 | Status: SHIPPED | OUTPATIENT
Start: 2019-01-16 | End: 2019-02-01

## 2019-01-16 NOTE — PROGRESS NOTES
CHIEF COMPLAINT:   Patient presents with:  Cough: cough, chest congestion, sinus pressure, x 3 days       HPI:   Catherine Keenan is a 47year old male who presents for upper respiratory symptoms for  3 days.  Patient reports congestion, dry cough, sinus NERVE ELECTRODE PLACEMENT AND MONITORING Right 4/6/2015    Performed by Svitlana Lazo MD at [facility] [place]      right shoulder arthroscopy   • OTHER SURGICAL HISTORY      right knee arthroscopy   • PAROTIDECTOMY Left 8 cough and congestion  (primary encounter diagnosis)  Exposure to pneumonia    PLAN: Discussed with patient that symptoms could still likely be viral, but due to exposure will cover for possible CAP. Meds as below.   Comfort care as described in Patient Ins

## 2019-02-01 ENCOUNTER — OFFICE VISIT (OUTPATIENT)
Dept: FAMILY MEDICINE CLINIC | Facility: CLINIC | Age: 54
End: 2019-02-01
Payer: COMMERCIAL

## 2019-02-01 VITALS
TEMPERATURE: 98 F | DIASTOLIC BLOOD PRESSURE: 78 MMHG | SYSTOLIC BLOOD PRESSURE: 110 MMHG | WEIGHT: 163 LBS | HEIGHT: 69 IN | BODY MASS INDEX: 24.14 KG/M2 | RESPIRATION RATE: 16 BRPM | HEART RATE: 80 BPM

## 2019-02-01 DIAGNOSIS — J02.9 ACUTE PHARYNGITIS, UNSPECIFIED ETIOLOGY: ICD-10-CM

## 2019-02-01 DIAGNOSIS — Z12.11 SCREEN FOR COLON CANCER: Primary | ICD-10-CM

## 2019-02-01 PROCEDURE — 99213 OFFICE O/P EST LOW 20 MIN: CPT | Performed by: FAMILY MEDICINE

## 2019-02-01 RX ORDER — AMOXICILLIN 875 MG/1
875 TABLET, COATED ORAL 2 TIMES DAILY
Qty: 14 TABLET | Refills: 0 | Status: SHIPPED | OUTPATIENT
Start: 2019-02-01 | End: 2019-02-15

## 2019-02-01 RX ORDER — METHYLPREDNISOLONE 4 MG/1
TABLET ORAL
Qty: 1 KIT | Refills: 0 | Status: SHIPPED | OUTPATIENT
Start: 2019-02-01 | End: 2019-03-05

## 2019-02-01 NOTE — PROGRESS NOTES
Patient presents with:  Laryngitis: symptoms started about two weeks ago       Mavis Richardson is a 47year old male who presents for sore throat. Pain of the throat last  2  weeks. Not worse or better, pain with swallowing liquids and solids.  Sharp, no • PAROTIDECTOMY Left 8/18/2016    Performed by Mariah Ayon MD at [facility] MAIN OR   • TYMPANOMASTOIDECTOMY W/O OSSICULAR CHAIN RECONSTRUCTION Right 4/6/2015    Performed by Svitlana Lazo MD at [address] [place] [address]      Family History   Problem Rela tablet; Refill: 0    2. Acute pharyngitis, unspecified etiology  -empiric treatment and oral steroid for slightly enlarged tonsils  - amoxicillin 875 MG Oral Tab; Take 1 tablet (875 mg total) by mouth 2 (two) times daily for 14 days.   Dispense: 14 tablet;

## 2019-02-08 DIAGNOSIS — F41.8 ANXIETY ABOUT HEALTH: ICD-10-CM

## 2019-02-08 RX ORDER — CLONAZEPAM 0.5 MG/1
TABLET ORAL
Qty: 30 TABLET | Refills: 0 | Status: SHIPPED
Start: 2019-02-08 | End: 2019-03-18

## 2019-03-05 RX ORDER — NEBIVOLOL HYDROCHLORIDE 2.5 MG/1
TABLET ORAL
Qty: 90 TABLET | Refills: 0 | Status: SHIPPED | OUTPATIENT
Start: 2019-03-05 | End: 2019-06-09

## 2019-03-06 ENCOUNTER — TELEPHONE (OUTPATIENT)
Dept: FAMILY MEDICINE CLINIC | Facility: CLINIC | Age: 54
End: 2019-03-06

## 2019-03-06 RX ORDER — ESZOPICLONE 2 MG/1
2 TABLET, FILM COATED ORAL NIGHTLY PRN
Qty: 30 TABLET | Refills: 0 | Status: SHIPPED
Start: 2019-03-06 | End: 2019-03-12 | Stop reason: ALTCHOICE

## 2019-03-06 NOTE — TELEPHONE ENCOUNTER
Attempted to call [facility] several times. Call would not go through. One time call was able to go through but when transferring to the pharmacy received a message of system issue to call back later. Will hema for follow-up.

## 2019-03-06 NOTE — TELEPHONE ENCOUNTER
Patient was seen yesterday and given Trazodone for insomnia. Patient is requesting an alternative medication. Trazodone can cause dry mouth and patient is unable to take this because he does not have any salivary glands.   Please advise on alternative med

## 2019-03-06 NOTE — TELEPHONE ENCOUNTER
Attempted to contact [facility] again. They are still having system phone problems. Rx pending for fax. Please approve pending Rx. Thank you!

## 2019-03-06 NOTE — TELEPHONE ENCOUNTER
Pt wants to know if he can have a new rx for sleeping pill the one that was prescribed he can not take because it causes dry mouth and he has no salivary glands. Was wondering if it can be called into his pharmacy today so he can have them tonight.

## 2019-03-07 NOTE — PROGRESS NOTES
Chief Complaint:   Patient presents with:  Sleep Problem    HPI:   This is a 47year old male presenting for follow-up.   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 Gastrointestinal: Negative for vomiting, abdominal pain, diarrhea, blood in stool and abdominal distention. Endocrine: Negative for cold intolerance, heat intolerance, polydipsia, polyphagia and polyuria.    Genitourinary: Negative for Edema not present. Pulmonary/Chest: Effort normal and breath sounds normal. No stridor. No respiratory distress. He has no wheezes. Abdominal: Soft. Bowel sounds are normal. He exhibits no distension. There is no tenderness.  There is no rebound and no

## 2019-03-07 NOTE — TELEPHONE ENCOUNTER
Patient called back and spoke with him. Advised patient that Colin Alatorre was sent to the pharmacy as a replacement for Trazodone. Patient states that this medication will also cause dry mouth.   Advised patient that dry mouth is a listed side effect of most m

## 2019-03-11 ENCOUNTER — TELEPHONE (OUTPATIENT)
Dept: FAMILY MEDICINE CLINIC | Facility: CLINIC | Age: 54
End: 2019-03-11

## 2019-03-11 DIAGNOSIS — F41.8 ANXIETY ABOUT HEALTH: ICD-10-CM

## 2019-03-11 RX ORDER — ESCITALOPRAM OXALATE 20 MG/1
TABLET ORAL
Qty: 90 TABLET | Refills: 1 | Status: SHIPPED | OUTPATIENT
Start: 2019-03-11 | End: 2019-09-13

## 2019-03-11 NOTE — TELEPHONE ENCOUNTER
Patient Keiry Momin calling, states he wants to go back on escitalopram 10 MG Oral Tab   Instead of taking lunesta. He doesn't feel right on lunesta his wife has told him is \"snappy\" as well.   He is also taking CLONAZEPAM 0.5 MG Oral Tab as needed for anxie

## 2019-03-11 NOTE — TELEPHONE ENCOUNTER
LOV: 3/5/19  Patient was given Rx for Trazodone for insomnia. However, this was changed to Lunesta d/t dry mouth. Patient was previously using Lexapro and Klonopin for Anxiety and sleep.   Patient is requesting to stop the Lunesta and restart Lexapro and

## 2019-03-11 NOTE — TELEPHONE ENCOUNTER
Patient called checking on the status of this. Patient said that Escitalopram 10 MG Oral Tab  is supposed to be 20mg.

## 2019-03-12 NOTE — TELEPHONE ENCOUNTER
Called patient and spoke with him. Advised patient that Rx was sent yesterday as requested. Patient states understanding.

## 2019-03-18 DIAGNOSIS — F41.8 ANXIETY ABOUT HEALTH: ICD-10-CM

## 2019-03-18 RX ORDER — CLONAZEPAM 0.5 MG/1
TABLET ORAL
Qty: 30 TABLET | Refills: 0 | Status: SHIPPED
Start: 2019-03-18 | End: 2019-04-15

## 2019-03-29 DIAGNOSIS — Z88.9 H/O SEASONAL ALLERGIES: ICD-10-CM

## 2019-04-01 RX ORDER — LORATADINE AND PSEUDOEPHEDRINE SULFATE 10; 240 MG/1; MG/1
TABLET, EXTENDED RELEASE ORAL
Qty: 90 TABLET | Refills: 0 | Status: SHIPPED
Start: 2019-04-01 | End: 2019-07-02

## 2019-04-01 NOTE — TELEPHONE ENCOUNTER
Medication(s) to Refill:   Requested Prescriptions     Pending Prescriptions Disp Refills   • CLARITIN-D 24 HOUR  MG Oral Tablet 24 Hr [Pharmacy Med Name: CLARITIN-D 24 HOUR TABLETS (NEW)] 90 tablet 0     Sig: TAKE ONE TABLET BY MOUTH ONCE DAILY

## 2019-04-15 ENCOUNTER — TELEPHONE (OUTPATIENT)
Dept: FAMILY MEDICINE CLINIC | Facility: CLINIC | Age: 54
End: 2019-04-15

## 2019-04-15 DIAGNOSIS — F41.8 ANXIETY ABOUT HEALTH: ICD-10-CM

## 2019-04-15 NOTE — TELEPHONE ENCOUNTER
Called patient and spoke with him. Patient is going to do his blood work tomorrow (orders already in [address]). Patient is asking for a lab that would show if his Sjogren's as worsened.   Patient is no longer seeing a rheumatologist. Please advise if there is

## 2019-04-15 NOTE — TELEPHONE ENCOUNTER
Let see how the lab work looks no further orders at this time. Goose bumps he's describing in normal to have and doesn't pose a medical risk. There's no lab to show if sjogren's has worsened.

## 2019-04-15 NOTE — TELEPHONE ENCOUNTER
Patient requesting a blood test and has questions regarding goose bumps. Please call back patient wants to do blood tests today.

## 2019-04-16 RX ORDER — CLONAZEPAM 0.5 MG/1
TABLET ORAL
Qty: 30 TABLET | Refills: 0 | COMMUNITY
Start: 2019-04-16 | End: 2019-06-19

## 2019-05-21 ENCOUNTER — LAB ENCOUNTER (OUTPATIENT)
Dept: LAB | Facility: HOSPITAL | Age: 54
End: 2019-05-21
Attending: FAMILY MEDICINE
Payer: COMMERCIAL

## 2019-05-21 DIAGNOSIS — Z13.21 SCREENING FOR ENDOCRINE, NUTRITIONAL, METABOLIC AND IMMUNITY DISORDER: ICD-10-CM

## 2019-05-21 DIAGNOSIS — Z13.0 SCREENING FOR ENDOCRINE, NUTRITIONAL, METABOLIC AND IMMUNITY DISORDER: ICD-10-CM

## 2019-05-21 DIAGNOSIS — Z13.228 SCREENING FOR ENDOCRINE, NUTRITIONAL, METABOLIC AND IMMUNITY DISORDER: ICD-10-CM

## 2019-05-21 DIAGNOSIS — Z13.29 SCREENING FOR ENDOCRINE, NUTRITIONAL, METABOLIC AND IMMUNITY DISORDER: ICD-10-CM

## 2019-05-21 PROCEDURE — 80053 COMPREHEN METABOLIC PANEL: CPT

## 2019-05-21 PROCEDURE — 85025 COMPLETE CBC W/AUTO DIFF WBC: CPT

## 2019-05-21 PROCEDURE — 84443 ASSAY THYROID STIM HORMONE: CPT

## 2019-05-21 PROCEDURE — 80061 LIPID PANEL: CPT

## 2019-05-21 PROCEDURE — 36415 COLL VENOUS BLD VENIPUNCTURE: CPT

## 2019-05-24 ENCOUNTER — OFFICE VISIT (OUTPATIENT)
Dept: HEMATOLOGY/ONCOLOGY | Facility: HOSPITAL | Age: 54
End: 2019-05-24
Attending: STUDENT IN AN ORGANIZED HEALTH CARE EDUCATION/TRAINING PROGRAM
Payer: COMMERCIAL

## 2019-05-24 VITALS
OXYGEN SATURATION: 98 % | BODY MASS INDEX: 24.44 KG/M2 | HEART RATE: 73 BPM | HEIGHT: 69.02 IN | RESPIRATION RATE: 18 BRPM | TEMPERATURE: 97 F | SYSTOLIC BLOOD PRESSURE: 105 MMHG | WEIGHT: 165 LBS | DIASTOLIC BLOOD PRESSURE: 70 MMHG

## 2019-05-24 DIAGNOSIS — C88.4 EXTRANODAL MARGINAL ZONE B-CELL LYMPHOMA OF MUCOSA-ASSOCIATED LYMPHOID TISSUE (MALT) (HCC): Primary | ICD-10-CM

## 2019-05-24 PROCEDURE — 99213 OFFICE O/P EST LOW 20 MIN: CPT | Performed by: SPECIALIST

## 2019-05-27 NOTE — PROGRESS NOTES
[facility] Progress Note      Patient Name: Mindy Brown   YOB: 1965  Medical Record Number: [id number]  Attending Physician: Suman Cooper M.D.      Date of Visit: 5/24/2019      Chief Complaint  Extranodal marginal zone B use; uses alcohol socially.        Current Medications     CLONAZEPAM 0.5 MG Oral Tab TAKE ONE TABLET BY MOUTH EVERY NIGHT AT BEDTIME AS NEEDED FOR ANXIETY Disp: 30 tablet Rfl: 0   CLARITIN-D 24 HOUR  MG Oral Tablet 24 Hr TAKE ONE TABLET BY MOUTH ONCE nose normal; external ears normal.  Neck Supple, without masses. Hematologic/Lymphatic Small mobile possible left cervical lymph node; No right cervical or bilateral supraclavicular, axillary or inguinal lymphadenopathy; no petechiae or purpura.   Respirat mIU/mL   PSA SCREEN    Collection Time: 05/21/19  5:52 AM   Result Value Ref Range    Prostate Specific Antigen Screen 1.39 <=4.00 ng/mL   CBC W/ DIFFERENTIAL    Collection Time: 05/21/19  5:52 AM   Result Value Ref Range    WBC 5.4 4.0 - 11.0 x10(3) uL

## 2019-06-06 ENCOUNTER — APPOINTMENT (OUTPATIENT)
Dept: HEMATOLOGY/ONCOLOGY | Facility: HOSPITAL | Age: 54
End: 2019-06-06
Attending: STUDENT IN AN ORGANIZED HEALTH CARE EDUCATION/TRAINING PROGRAM
Payer: COMMERCIAL

## 2019-06-10 RX ORDER — NEBIVOLOL HYDROCHLORIDE 2.5 MG/1
TABLET ORAL
Qty: 90 TABLET | Refills: 0 | Status: SHIPPED | OUTPATIENT
Start: 2019-06-10 | End: 2019-09-13

## 2019-06-19 ENCOUNTER — TELEPHONE (OUTPATIENT)
Dept: HEMATOLOGY/ONCOLOGY | Facility: HOSPITAL | Age: 54
End: 2019-06-19

## 2019-06-19 DIAGNOSIS — F41.8 ANXIETY ABOUT HEALTH: ICD-10-CM

## 2019-06-19 NOTE — TELEPHONE ENCOUNTER
Per Raffy Blackwell. Lumps are very small. Patient not to stress. Patient to follow up in one month. Patient stated understanding - transferred to [facility] [place] [place].

## 2019-06-20 RX ORDER — CLONAZEPAM 0.5 MG/1
TABLET ORAL
Qty: 30 TABLET | Refills: 0 | Status: SHIPPED
Start: 2019-06-20 | End: 2019-07-15

## 2019-06-20 NOTE — TELEPHONE ENCOUNTER
Medication(s) to Refill:   Requested Prescriptions     Pending Prescriptions Disp Refills   • clonazePAM 0.5 MG Oral Tab [Pharmacy Med Name: CLONAZEPAM 0.5MG TABLETS] 30 tablet 0     Sig: TAKE 1 TABLET BY MOUTH EVERY NIGHT AT BEDTIME AS NEEDED         Reas

## 2019-07-02 DIAGNOSIS — Z88.9 H/O SEASONAL ALLERGIES: ICD-10-CM

## 2019-07-03 RX ORDER — LORATADINE PSEUDOEPHEDRINE SULFATE 10; 240 MG/1; MG/1
TABLET, EXTENDED RELEASE ORAL
Qty: 90 TABLET | Refills: 0 | Status: SHIPPED
Start: 2019-07-03 | End: 2019-10-14

## 2019-07-12 ENCOUNTER — OFFICE VISIT (OUTPATIENT)
Dept: HEMATOLOGY/ONCOLOGY | Facility: HOSPITAL | Age: 54
End: 2019-07-12
Attending: STUDENT IN AN ORGANIZED HEALTH CARE EDUCATION/TRAINING PROGRAM
Payer: COMMERCIAL

## 2019-07-12 VITALS
TEMPERATURE: 97 F | OXYGEN SATURATION: 97 % | RESPIRATION RATE: 18 BRPM | HEIGHT: 69.02 IN | BODY MASS INDEX: 24.29 KG/M2 | WEIGHT: 164 LBS | HEART RATE: 75 BPM | DIASTOLIC BLOOD PRESSURE: 72 MMHG | SYSTOLIC BLOOD PRESSURE: 113 MMHG

## 2019-07-12 DIAGNOSIS — C88.4 EXTRANODAL MARGINAL ZONE B-CELL LYMPHOMA OF MUCOSA-ASSOCIATED LYMPHOID TISSUE (MALT) (HCC): Primary | ICD-10-CM

## 2019-07-12 DIAGNOSIS — R59.1 LYMPHADENOPATHY: ICD-10-CM

## 2019-07-12 LAB
ALBUMIN SERPL-MCNC: 3.8 G/DL (ref 3.4–5)
ALBUMIN/GLOB SERPL: 1 {RATIO} (ref 1–2)
ALP LIVER SERPL-CCNC: 119 U/L (ref 45–117)
ALT SERPL-CCNC: 38 U/L (ref 16–61)
ANION GAP SERPL CALC-SCNC: 4 MMOL/L (ref 0–18)
AST SERPL-CCNC: 31 U/L (ref 15–37)
BASOPHILS # BLD AUTO: 0.01 X10(3) UL (ref 0–0.2)
BASOPHILS NFR BLD AUTO: 0.2 %
BILIRUB SERPL-MCNC: 0.3 MG/DL (ref 0.1–2)
BUN BLD-MCNC: 20 MG/DL (ref 7–18)
BUN/CREAT SERPL: 18.3 (ref 10–20)
CALCIUM BLD-MCNC: 9.3 MG/DL (ref 8.5–10.1)
CHLORIDE SERPL-SCNC: 108 MMOL/L (ref 98–112)
CO2 SERPL-SCNC: 27 MMOL/L (ref 21–32)
CREAT BLD-MCNC: 1.09 MG/DL (ref 0.7–1.3)
DEPRECATED RDW RBC AUTO: 36.2 FL (ref 35.1–46.3)
EOSINOPHIL # BLD AUTO: 0.18 X10(3) UL (ref 0–0.7)
EOSINOPHIL NFR BLD AUTO: 3.5 %
ERYTHROCYTE [DISTWIDTH] IN BLOOD BY AUTOMATED COUNT: 11.2 % (ref 11–15)
GLOBULIN PLAS-MCNC: 3.9 G/DL (ref 2.8–4.4)
GLUCOSE BLD-MCNC: 81 MG/DL (ref 70–99)
HCT VFR BLD AUTO: 43.3 % (ref 39–53)
HGB BLD-MCNC: 15 G/DL (ref 13–17.5)
IMM GRANULOCYTES # BLD AUTO: 0.01 X10(3) UL (ref 0–1)
IMM GRANULOCYTES NFR BLD: 0.2 %
LDH SERPL L TO P-CCNC: 248 U/L (ref 87–241)
LYMPHOCYTES # BLD AUTO: 1.06 X10(3) UL (ref 1–4)
LYMPHOCYTES NFR BLD AUTO: 20.7 %
M PROTEIN MFR SERPL ELPH: 7.7 G/DL (ref 6.4–8.2)
MCH RBC QN AUTO: 31.1 PG (ref 26–34)
MCHC RBC AUTO-ENTMCNC: 34.6 G/DL (ref 31–37)
MCV RBC AUTO: 89.6 FL (ref 80–100)
MONOCYTES # BLD AUTO: 0.65 X10(3) UL (ref 0.1–1)
MONOCYTES NFR BLD AUTO: 12.7 %
NEUTROPHILS # BLD AUTO: 3.21 X10 (3) UL (ref 1.5–7.7)
NEUTROPHILS # BLD AUTO: 3.21 X10(3) UL (ref 1.5–7.7)
NEUTROPHILS NFR BLD AUTO: 62.7 %
OSMOLALITY SERPL CALC.SUM OF ELEC: 290 MOSM/KG (ref 275–295)
PLATELET # BLD AUTO: 180 10(3)UL (ref 150–450)
POTASSIUM SERPL-SCNC: 4.3 MMOL/L (ref 3.5–5.1)
RBC # BLD AUTO: 4.83 X10(6)UL (ref 4.3–5.7)
SODIUM SERPL-SCNC: 139 MMOL/L (ref 136–145)
WBC # BLD AUTO: 5.1 X10(3) UL (ref 4–11)

## 2019-07-12 PROCEDURE — 99213 OFFICE O/P EST LOW 20 MIN: CPT | Performed by: SPECIALIST

## 2019-07-15 DIAGNOSIS — F41.8 ANXIETY ABOUT HEALTH: ICD-10-CM

## 2019-07-16 RX ORDER — CLONAZEPAM 0.5 MG/1
TABLET ORAL
Qty: 30 TABLET | Refills: 0 | Status: SHIPPED
Start: 2019-07-16 | End: 2019-08-17

## 2019-07-18 ENCOUNTER — HOSPITAL ENCOUNTER (OUTPATIENT)
Dept: CT IMAGING | Age: 54
End: 2019-07-18
Attending: SPECIALIST
Payer: COMMERCIAL

## 2019-07-18 ENCOUNTER — HOSPITAL ENCOUNTER (OUTPATIENT)
Dept: CT IMAGING | Age: 54
Discharge: HOME | End: 2019-07-18
Attending: SPECIALIST
Payer: COMMERCIAL

## 2019-07-18 DIAGNOSIS — R59.1 LYMPHADENOPATHY: ICD-10-CM

## 2019-07-18 DIAGNOSIS — C88.4 EXTRANODAL MARGINAL ZONE B-CELL LYMPHOMA OF MUCOSA-ASSOCIATED LYMPHOID TISSUE (MALT) (HCC): ICD-10-CM

## 2019-07-18 PROCEDURE — 70491 CT SOFT TISSUE NECK W/DYE: CPT | Performed by: SPECIALIST

## 2019-07-18 PROCEDURE — 74177 CT ABD & PELVIS W/CONTRAST: CPT | Performed by: SPECIALIST

## 2019-07-18 PROCEDURE — 71260 CT THORAX DX C+: CPT | Performed by: SPECIALIST

## 2019-07-20 NOTE — PROGRESS NOTES
[facility] Progress Note      Patient Name: Abdirahman Betts   YOB: 1965  Medical Record Number: [id number]  Attending Physician: Leanne Hurtado M.D.      Date of Visit: 7/12/2019      Chief Complaint  Extranodal marginal zone B Social History (historical data, reviewed by physician)  Denies tobacco and illicit drug use; uses alcohol socially.        Current Medications     CLARITIN-D 24 HOUR  MG Oral Tablet 24 Hr TAKE ONE TABLET BY MOUTH ONCE DAILY Disp: 90 tablet Rfl: 0 bilaterally. Eyes Conjunctiva clear; sclera anicteric. ENMT Oropharynx clear, external nose normal; external ears normal.  Neck Supple, without masses. Hematologic/Lymphatic Small mobile possible left cervical lymph node;  No right cervical or bilateral 180.0 150.0 - 450.0 10(3)uL    MCV 89.6 80.0 - 100.0 fL    MCH 31.1 26.0 - 34.0 pg    MCHC 34.6 31.0 - 37.0 g/dL    RDW 11.2 11.0 - 15.0 %    RDW-SD 36.2 35.1 - 46.3 fL    Neutrophil Absolute Prelim 3.21 1.50 - 7.70 x10 (3) uL    Neutrophil Absolute 3.21 1

## 2019-08-17 DIAGNOSIS — F41.8 ANXIETY ABOUT HEALTH: ICD-10-CM

## 2019-08-19 RX ORDER — CLONAZEPAM 0.5 MG/1
TABLET ORAL
Qty: 30 TABLET | Refills: 0 | Status: SHIPPED
Start: 2019-08-19 | End: 2019-09-18

## 2019-08-19 NOTE — TELEPHONE ENCOUNTER
Medication(s) to Refill:   Requested Prescriptions     Pending Prescriptions Disp Refills   • clonazePAM 0.5 MG Oral Tab [Pharmacy Med Name: CLONAZEPAM 0.5MG TABLETS] 30 tablet 0     Sig: TAKE ONE TABLET BY MOUTH EVERY NIGHT AT BEDTIME AS NEEDED         Re

## 2019-09-13 DIAGNOSIS — F41.8 ANXIETY ABOUT HEALTH: ICD-10-CM

## 2019-09-13 RX ORDER — NEBIVOLOL HYDROCHLORIDE 2.5 MG/1
TABLET ORAL
Qty: 90 TABLET | Refills: 1 | Status: SHIPPED | OUTPATIENT
Start: 2019-09-13 | End: 2020-03-19

## 2019-09-13 RX ORDER — ESCITALOPRAM OXALATE 20 MG/1
TABLET ORAL
Qty: 90 TABLET | Refills: 1 | Status: SHIPPED | OUTPATIENT
Start: 2019-09-13 | End: 2020-03-19

## 2019-09-18 DIAGNOSIS — F41.8 ANXIETY ABOUT HEALTH: ICD-10-CM

## 2019-09-19 RX ORDER — CLONAZEPAM 0.5 MG/1
TABLET ORAL
Qty: 30 TABLET | Refills: 2 | Status: SHIPPED | OUTPATIENT
Start: 2019-09-19 | End: 2020-01-20

## 2019-09-19 NOTE — TELEPHONE ENCOUNTER
Medication(s) to Refill:   Requested Prescriptions     Pending Prescriptions Disp Refills   • CLONAZEPAM 0.5 MG Oral Tab [Pharmacy Med Name: CLONAZEPAM 0.5MG TABLETS] 30 tablet      Sig: TAKE ONE TABLET BY MOUTH EVERY NIGHT AT BEDTIME AS NEEDED         Sharon Lindquist

## 2019-09-30 NOTE — PROGRESS NOTES
[facility] Progress Note      Patient Name: Jodi Wen   YOB: 1965  Medical Record Number: [id number]  Attending Physician: Rodger Long M.D.      Date of Visit: 10/7/2019      Chief Complaint  Extranodal marginal zone B MG Oral Tab TAKE ONE TABLET BY MOUTH EVERY NIGHT AT BEDTIME AS NEEDED Disp: 30 tablet Rfl: 2   BYSTOLIC 2.5 MG Oral Tab TAKE 1 TABLET BY MOUTH EVERY DAY Disp: 90 tablet Rfl: 1   ESCITALOPRAM 20 MG Oral Tab TAKE 1 TABLET(20 MG) BY MOUTH DAILY Disp: 90 table masses. Hematologic/Lymphatic No cervical, supraclavicular, axillary or inguinal lymphadenopathy; no petechiae or purpura. Respiratory  Normal effort; no respiratory distress; lungs clear to auscultation bilaterally.   Cardiovascular  Regular rate and rhy

## 2019-10-07 ENCOUNTER — OFFICE VISIT (OUTPATIENT)
Dept: HEMATOLOGY/ONCOLOGY | Facility: HOSPITAL | Age: 54
End: 2019-10-07
Attending: SPECIALIST
Payer: COMMERCIAL

## 2019-10-07 VITALS
DIASTOLIC BLOOD PRESSURE: 70 MMHG | SYSTOLIC BLOOD PRESSURE: 114 MMHG | OXYGEN SATURATION: 98 % | BODY MASS INDEX: 23.7 KG/M2 | TEMPERATURE: 97 F | RESPIRATION RATE: 18 BRPM | WEIGHT: 160 LBS | HEART RATE: 90 BPM | HEIGHT: 69.02 IN

## 2019-10-07 DIAGNOSIS — C88.4 EXTRANODAL MARGINAL ZONE B-CELL LYMPHOMA OF MUCOSA-ASSOCIATED LYMPHOID TISSUE (MALT) (HCC): Primary | ICD-10-CM

## 2019-10-07 PROCEDURE — 99213 OFFICE O/P EST LOW 20 MIN: CPT | Performed by: SPECIALIST

## 2019-10-07 NOTE — PROGRESS NOTES
Patient is here today for followup  with Davide Boyce for B cell Lymphoma (MALT). Patient denies pain. Medication list and medical history were reviewed and updated.      Education Record    Learner:  Patient    Disease / Diagnosis: B Cell Lymphoma (MALT)

## 2019-10-14 DIAGNOSIS — Z88.9 H/O SEASONAL ALLERGIES: ICD-10-CM

## 2019-10-15 RX ORDER — LORATADINE AND PSEUDOEPHEDRINE SULFATE 10; 240 MG/1; MG/1
TABLET, EXTENDED RELEASE ORAL
Qty: 90 TABLET | Refills: 0 | Status: SHIPPED | OUTPATIENT
Start: 2019-10-15 | End: 2020-04-27

## 2019-10-15 NOTE — TELEPHONE ENCOUNTER
Medication(s) to Refill:   Requested Prescriptions     Pending Prescriptions Disp Refills   • CLARITIN-D 24 HOUR  MG Oral Tablet 24 Hr [Pharmacy Med Name: CLARITIN-D 24 HOUR TABLETS (NEW)] 90 tablet      Sig: TAKE ONE TABLET BY MOUTH ONCE DAILY

## 2019-11-06 ENCOUNTER — TELEPHONE (OUTPATIENT)
Dept: FAMILY MEDICINE CLINIC | Facility: CLINIC | Age: 54
End: 2019-11-06

## 2019-11-06 NOTE — TELEPHONE ENCOUNTER
Patient would like to taper himself of Clonazepam.  Patient currently is taking 1 tablet by mouth nightly. Please advise. Thank you!

## 2019-11-06 NOTE — TELEPHONE ENCOUNTER
Pt called to ask about tapering himself off of the Clonazepam 0.5mg. Please call pt back with instruction on his cell. Thanks!

## 2019-11-07 NOTE — TELEPHONE ENCOUNTER
Called patient and spoke with him. Patient is currently taking 0.5mg (1 tablet) nightly. Advised him to take this every other night for 2 weeks then stop per below. Patient states understanding.

## 2019-11-25 ENCOUNTER — APPOINTMENT (OUTPATIENT)
Dept: HEMATOLOGY/ONCOLOGY | Facility: HOSPITAL | Age: 54
End: 2019-11-25
Attending: STUDENT IN AN ORGANIZED HEALTH CARE EDUCATION/TRAINING PROGRAM
Payer: COMMERCIAL

## 2020-01-06 ENCOUNTER — TELEPHONE (OUTPATIENT)
Dept: FAMILY MEDICINE CLINIC | Facility: CLINIC | Age: 55
End: 2020-01-06

## 2020-01-06 ENCOUNTER — HOSPITAL ENCOUNTER (OUTPATIENT)
Dept: CT IMAGING | Age: 55
Discharge: HOME | End: 2020-01-06
Attending: NURSE PRACTITIONER
Payer: COMMERCIAL

## 2020-01-06 ENCOUNTER — OFFICE VISIT (OUTPATIENT)
Dept: FAMILY MEDICINE CLINIC | Facility: CLINIC | Age: 55
End: 2020-01-06
Payer: COMMERCIAL

## 2020-01-06 ENCOUNTER — LAB ENCOUNTER (OUTPATIENT)
Dept: LAB | Age: 55
End: 2020-01-06
Attending: NURSE PRACTITIONER
Payer: COMMERCIAL

## 2020-01-06 VITALS
BODY MASS INDEX: 24.88 KG/M2 | OXYGEN SATURATION: 98 % | HEIGHT: 69 IN | TEMPERATURE: 98 F | HEART RATE: 92 BPM | WEIGHT: 168 LBS | RESPIRATION RATE: 16 BRPM | DIASTOLIC BLOOD PRESSURE: 70 MMHG | SYSTOLIC BLOOD PRESSURE: 100 MMHG

## 2020-01-06 DIAGNOSIS — R31.1 BENIGN ESSENTIAL MICROSCOPIC HEMATURIA: ICD-10-CM

## 2020-01-06 DIAGNOSIS — R10.31 RLQ ABDOMINAL PAIN: ICD-10-CM

## 2020-01-06 DIAGNOSIS — R10.31 RLQ ABDOMINAL PAIN: Primary | ICD-10-CM

## 2020-01-06 LAB
ALBUMIN SERPL-MCNC: 3.9 G/DL (ref 3.4–5)
ALBUMIN/GLOB SERPL: 0.9 {RATIO} (ref 1–2)
ALP LIVER SERPL-CCNC: 108 U/L (ref 45–117)
ALT SERPL-CCNC: 40 U/L (ref 16–61)
AMYLASE SERPL-CCNC: 64 U/L (ref 25–115)
ANION GAP SERPL CALC-SCNC: 4 MMOL/L (ref 0–18)
APPEARANCE: CLEAR
AST SERPL-CCNC: 34 U/L (ref 15–37)
BASOPHILS # BLD AUTO: 0.02 X10(3) UL (ref 0–0.2)
BASOPHILS NFR BLD AUTO: 0.3 %
BILIRUB SERPL-MCNC: 0.7 MG/DL (ref 0.1–2)
BILIRUBIN: NEGATIVE
BUN BLD-MCNC: 17 MG/DL (ref 7–18)
BUN/CREAT SERPL: 14.2 (ref 10–20)
CALCIUM BLD-MCNC: 9.6 MG/DL (ref 8.5–10.1)
CHLORIDE SERPL-SCNC: 107 MMOL/L (ref 98–112)
CO2 SERPL-SCNC: 30 MMOL/L (ref 21–32)
CREAT BLD-MCNC: 1.2 MG/DL (ref 0.7–1.3)
CREAT BLD-MCNC: 1.2 MG/DL (ref 0.7–1.3)
DEPRECATED RDW RBC AUTO: 37.5 FL (ref 35.1–46.3)
EOSINOPHIL # BLD AUTO: 0.13 X10(3) UL (ref 0–0.7)
EOSINOPHIL NFR BLD AUTO: 2.1 %
ERYTHROCYTE [DISTWIDTH] IN BLOOD BY AUTOMATED COUNT: 11.3 % (ref 11–15)
GLOBULIN PLAS-MCNC: 4.2 G/DL (ref 2.8–4.4)
GLUCOSE (URINE DIPSTICK): NEGATIVE MG/DL
GLUCOSE BLD-MCNC: 88 MG/DL (ref 70–99)
HCT VFR BLD AUTO: 45.5 % (ref 39–53)
HGB BLD-MCNC: 15.4 G/DL (ref 13–17.5)
IMM GRANULOCYTES # BLD AUTO: 0.01 X10(3) UL (ref 0–1)
IMM GRANULOCYTES NFR BLD: 0.2 %
KETONES (URINE DIPSTICK): NEGATIVE MG/DL
LEUKOCYTES: NEGATIVE
LIPASE SERPL-CCNC: 180 U/L (ref 73–393)
LYMPHOCYTES # BLD AUTO: 1.26 X10(3) UL (ref 1–4)
LYMPHOCYTES NFR BLD AUTO: 20.6 %
M PROTEIN MFR SERPL ELPH: 8.1 G/DL (ref 6.4–8.2)
MCH RBC QN AUTO: 30.7 PG (ref 26–34)
MCHC RBC AUTO-ENTMCNC: 33.8 G/DL (ref 31–37)
MCV RBC AUTO: 90.6 FL (ref 80–100)
MONOCYTES # BLD AUTO: 0.87 X10(3) UL (ref 0.1–1)
MONOCYTES NFR BLD AUTO: 14.2 %
MULTISTIX LOT#: NORMAL NUMERIC
NEUTROPHILS # BLD AUTO: 3.82 X10 (3) UL (ref 1.5–7.7)
NEUTROPHILS # BLD AUTO: 3.82 X10(3) UL (ref 1.5–7.7)
NEUTROPHILS NFR BLD AUTO: 62.6 %
NITRITE, URINE: NEGATIVE
OSMOLALITY SERPL CALC.SUM OF ELEC: 293 MOSM/KG (ref 275–295)
PATIENT FASTING Y/N/NP: NO
PH, URINE: 5.5 (ref 4.5–8)
PLATELET # BLD AUTO: 238 10(3)UL (ref 150–450)
POTASSIUM SERPL-SCNC: 4.3 MMOL/L (ref 3.5–5.1)
PROTEIN (URINE DIPSTICK): NEGATIVE MG/DL
RBC # BLD AUTO: 5.02 X10(6)UL (ref 4.3–5.7)
SODIUM SERPL-SCNC: 141 MMOL/L (ref 136–145)
SPECIFIC GRAVITY: 1.03 (ref 1–1.03)
URINE-COLOR: YELLOW
UROBILINOGEN,SEMI-QN: 0.2 MG/DL (ref 0–1.9)
WBC # BLD AUTO: 6.1 X10(3) UL (ref 4–11)

## 2020-01-06 PROCEDURE — 85025 COMPLETE CBC W/AUTO DIFF WBC: CPT

## 2020-01-06 PROCEDURE — 80053 COMPREHEN METABOLIC PANEL: CPT

## 2020-01-06 PROCEDURE — 82150 ASSAY OF AMYLASE: CPT

## 2020-01-06 PROCEDURE — 81003 URINALYSIS AUTO W/O SCOPE: CPT | Performed by: NURSE PRACTITIONER

## 2020-01-06 PROCEDURE — 82565 ASSAY OF CREATININE: CPT

## 2020-01-06 PROCEDURE — 36415 COLL VENOUS BLD VENIPUNCTURE: CPT

## 2020-01-06 PROCEDURE — 83690 ASSAY OF LIPASE: CPT

## 2020-01-06 PROCEDURE — 74177 CT ABD & PELVIS W/CONTRAST: CPT | Performed by: NURSE PRACTITIONER

## 2020-01-06 PROCEDURE — 99214 OFFICE O/P EST MOD 30 MIN: CPT | Performed by: NURSE PRACTITIONER

## 2020-01-06 NOTE — TELEPHONE ENCOUNTER
Called patient and spoke with him. Patient states that for about the last 6 days he has noticed pain to his right lower abdomen. Patient states that this pain is random and will come and go.   Patient has not been able to figure out what causes the pain o

## 2020-01-06 NOTE — PATIENT INSTRUCTIONS
Abdominal Pain    Abdominal pain is pain in the stomach or belly area. Everyone has this pain from time to time. In many cases it goes away on its own. But abdominal pain can sometimes be due to a serious problem, such as appendicitis.  So it’s important Also report any family history of stomach or intestinal problems, or cancers. Tell your provider about all your alcohol use and drug use. Tell your provider about all medicines you use, including herbs, vitamins, and supplements.    Treating abdominal pain · Don't use aspirin or over-the-counter pain and fever medicines, if possible. This includes nonsteroidal anti-inflammatory drugs (NSAIDs). · Lose excess weight. · Finish eating at least 2 hours before you go to bed or lie down.   · Raise the head of your Follow up with your healthcare provider, or as advised. If you were injured and had blood in your urine, you should have a repeat urine test in 1 to 2 days. Contact your doctor for this test.  A radiologist will review any X-rays that were taken.  You will

## 2020-01-06 NOTE — PROGRESS NOTES
Kim Ragland is a 49year old male who presents for abdominal pain. HPI:  The patient complaints of abdominal pain. Pain is located at  RLQ. Pain is described as strain, pulling pain. Severity is moderate. Associated symptoms: no bloating.  No r TABLET BY MOUTH ONCE DAILY 90 tablet 0   • CLONAZEPAM 0.5 MG Oral Tab TAKE ONE TABLET BY MOUTH EVERY NIGHT AT BEDTIME AS NEEDED 30 tablet 2   • BYSTOLIC 2.5 MG Oral Tab TAKE 1 TABLET BY MOUTH EVERY DAY 90 tablet 1   • ESCITALOPRAM 20 MG Oral Tab TAKE 1 TAB skin lesions, rash. HEENT:no abnormal taste  LUNGS: denies shortness of breath with exertion  CARDIOVASCULAR: denies chest pain on exertion  GI: as above. No heartburn. No melena, no rectal bleeding. No vomiting. :no dysuria.   MUSCULOSKELETAL: no myalgia

## 2020-01-06 NOTE — TELEPHONE ENCOUNTER
New or ongoing issue: new    Brief description of symptoms: pain on lower right side    Approximately how long? : 6 days    Offered appointment: yes, no availability with Dr. Elana Chris, offered 2:30pm with NP    Appointment scheduled:  no

## 2020-01-20 DIAGNOSIS — F41.8 ANXIETY ABOUT HEALTH: ICD-10-CM

## 2020-01-20 RX ORDER — CLONAZEPAM 0.5 MG/1
TABLET ORAL
Qty: 30 TABLET | Refills: 0 | Status: SHIPPED | OUTPATIENT
Start: 2020-01-20 | End: 2020-03-23

## 2020-03-06 ENCOUNTER — OFFICE VISIT (OUTPATIENT)
Dept: HEMATOLOGY/ONCOLOGY | Facility: HOSPITAL | Age: 55
End: 2020-03-06
Attending: SPECIALIST
Payer: COMMERCIAL

## 2020-03-06 VITALS
TEMPERATURE: 97 F | RESPIRATION RATE: 16 BRPM | SYSTOLIC BLOOD PRESSURE: 117 MMHG | BODY MASS INDEX: 24.44 KG/M2 | WEIGHT: 165 LBS | OXYGEN SATURATION: 99 % | HEART RATE: 66 BPM | DIASTOLIC BLOOD PRESSURE: 74 MMHG | HEIGHT: 69.02 IN

## 2020-03-06 DIAGNOSIS — C88.4 EXTRANODAL MARGINAL ZONE B-CELL LYMPHOMA OF MUCOSA-ASSOCIATED LYMPHOID TISSUE (MALT) (HCC): Primary | ICD-10-CM

## 2020-03-06 PROCEDURE — 99213 OFFICE O/P EST LOW 20 MIN: CPT | Performed by: SPECIALIST

## 2020-03-06 NOTE — PROGRESS NOTES
[facility] Progress Note      Patient Name: Yvonne Guillen   YOB: 1965  Medical Record Number: [id number]  Attending Physician: Angel Rojas M.D.      Date of Visit: 3/6/2020      Chief Complaint  Extranodal Nichol Penaloza outpatient medications have been marked as taking for the 3/6/20 encounter (Appointment) with Indigo Ballard MD.    Allergies   Mr. Ricardo Bates is allergic to bactrim; alc-benzyl alc-sulfamethoxazole-trimethoprim; sulfamethoxazole w/trimethoprim; and clinical evidence of definitive relapsed disease. Continue active surveillance. Planned Follow Up   Patient will return for follow up in 6 months. Risk Level: High - MALT lymphoma. Electronically signed by:    Vicenta Montes M.D.   [facility]

## 2020-03-09 ENCOUNTER — TELEPHONE (OUTPATIENT)
Dept: FAMILY MEDICINE CLINIC | Facility: CLINIC | Age: 55
End: 2020-03-09

## 2020-03-09 ENCOUNTER — TELEPHONE (OUTPATIENT)
Dept: HEMATOLOGY/ONCOLOGY | Facility: HOSPITAL | Age: 55
End: 2020-03-09

## 2020-03-09 NOTE — TELEPHONE ENCOUNTER
Pt calling in, stating that he is supposed to be flying out of the country beginning of may but because of his the virus would like a note stating that he is unable to fly due to his weakened immune system so he can get a full refund.  Please advise

## 2020-03-09 NOTE — TELEPHONE ENCOUNTER
Per Kyra Morel. Patient notified. He cannot write a letter as he is not immune compromised. Patient stated understanding.

## 2020-03-09 NOTE — TELEPHONE ENCOUNTER
Alyce Milling calling asking if he can get a letter from Dr. Werner Hernandez so he can get a refund for his trip out of the country. His wife thinks he shouldn't go with his immune system being what it is.  Thanks

## 2020-03-09 NOTE — TELEPHONE ENCOUNTER
Spoke to pt and explained should get the letter from Dr. Dillon Nash and pt verbalized understanding.

## 2020-03-09 NOTE — TELEPHONE ENCOUNTER
Patient is requesting a note that he should not travel out of the country in May d/t his \"weakend immune system. \"  Patient has a history of lymphoma and saw Dr. Judi Roth on 3/6/20. Patient is requesting a doctors note to get a full refund.   Please advise

## 2020-03-16 PROBLEM — R77.8 ELEVATED TOTAL PROTEIN: Status: RESOLVED | Noted: 2017-01-22 | Resolved: 2020-03-16

## 2020-03-16 NOTE — PROGRESS NOTES
Chief Complaint:   Patient presents with:  Abdominal Pain: noticed about two months ago     HPI:   This is a 49year old male presenting for follow-up. Patient reports pain is not under control. Location: right mid and lower   He reports no injury. at [facility]     Social History:  Social History    Tobacco Use      Smoking status: Never Smoker      Smokeless tobacco: Never Used    Alcohol use: Yes      Comment: 2x a month    Drug use: No    Family History:  Family History   Problem Re Gastrointestinal: Positive for abdominal pain. Negative for vomiting, diarrhea, blood in stool and abdominal distention. Endocrine: Negative for cold intolerance, heat intolerance, polydipsia, polyphagia and polyuria.    Genitourinary: Negative for dysu not present. Pulmonary/Chest: Effort normal and breath sounds normal. No stridor. No respiratory distress. He has no wheezes. Abdominal: Soft. Bowel sounds are normal. He exhibits no distension. There is no tenderness.  There is no rebound and no guardin

## 2020-03-19 DIAGNOSIS — F41.8 ANXIETY ABOUT HEALTH: ICD-10-CM

## 2020-03-19 RX ORDER — NEBIVOLOL HYDROCHLORIDE 2.5 MG/1
TABLET ORAL
Qty: 90 TABLET | Refills: 1 | Status: SHIPPED | OUTPATIENT
Start: 2020-03-19 | End: 2020-09-28

## 2020-03-19 RX ORDER — ESCITALOPRAM OXALATE 20 MG/1
TABLET ORAL
Qty: 90 TABLET | Refills: 1 | Status: SHIPPED | OUTPATIENT
Start: 2020-03-19 | End: 2020-11-19

## 2020-03-19 NOTE — TELEPHONE ENCOUNTER
Medication(s) to Refill:   Requested Prescriptions     Pending Prescriptions Disp Refills   • ESCITALOPRAM 20 MG Oral Tab [Pharmacy Med Name: ESCITALOPRAM 20MG TABLETS] 90 tablet 1     Sig: TAKE 1 TABLET(20 MG) BY MOUTH DAILY   • BYSTOLIC 2.5 MG Oral Tab [

## 2020-03-21 DIAGNOSIS — F41.8 ANXIETY ABOUT HEALTH: ICD-10-CM

## 2020-03-23 RX ORDER — CLONAZEPAM 0.5 MG/1
TABLET ORAL
Qty: 30 TABLET | Refills: 0 | Status: SHIPPED | OUTPATIENT
Start: 2020-03-23 | End: 2020-05-07

## 2020-04-09 ENCOUNTER — APPOINTMENT (OUTPATIENT)
Dept: HEMATOLOGY/ONCOLOGY | Facility: HOSPITAL | Age: 55
End: 2020-04-09
Attending: SPECIALIST
Payer: COMMERCIAL

## 2020-04-25 DIAGNOSIS — Z88.9 H/O SEASONAL ALLERGIES: ICD-10-CM

## 2020-04-27 RX ORDER — LORATADINE AND PSEUDOEPHEDRINE SULFATE 10; 240 MG/1; MG/1
TABLET, EXTENDED RELEASE ORAL
Qty: 90 TABLET | Refills: 0 | Status: SHIPPED | OUTPATIENT
Start: 2020-04-27 | End: 2020-07-24

## 2020-04-27 NOTE — TELEPHONE ENCOUNTER
Medication(s) to Refill:   Requested Prescriptions     Pending Prescriptions Disp Refills   • CLARITIN-D 24 HOUR  MG Oral Tablet 24 Hr [Pharmacy Med Name: CLARITIN-D 24 HOUR TABLETS (NEW)] 90 tablet 0     Sig: TAKE 1 TABLET BY MOUTH ONCE DAILY

## 2020-05-07 DIAGNOSIS — F41.8 ANXIETY ABOUT HEALTH: ICD-10-CM

## 2020-05-07 RX ORDER — CLONAZEPAM 0.5 MG/1
TABLET ORAL
Qty: 30 TABLET | Refills: 0 | Status: SHIPPED | OUTPATIENT
Start: 2020-05-07 | End: 2020-06-19

## 2020-05-11 ENCOUNTER — OFFICE VISIT (OUTPATIENT)
Dept: FAMILY MEDICINE CLINIC | Facility: CLINIC | Age: 55
End: 2020-05-11
Payer: COMMERCIAL

## 2020-05-11 VITALS
BODY MASS INDEX: 24.45 KG/M2 | HEART RATE: 80 BPM | RESPIRATION RATE: 16 BRPM | TEMPERATURE: 98 F | SYSTOLIC BLOOD PRESSURE: 122 MMHG | HEIGHT: 69.2 IN | DIASTOLIC BLOOD PRESSURE: 74 MMHG | WEIGHT: 167 LBS

## 2020-05-11 DIAGNOSIS — G45.9 TIA (TRANSIENT ISCHEMIC ATTACK): ICD-10-CM

## 2020-05-11 DIAGNOSIS — M67.40 GANGLION CYST: Primary | ICD-10-CM

## 2020-05-11 DIAGNOSIS — Z12.11 SCREEN FOR COLON CANCER: ICD-10-CM

## 2020-05-11 PROCEDURE — 99214 OFFICE O/P EST MOD 30 MIN: CPT | Performed by: FAMILY MEDICINE

## 2020-05-11 NOTE — PROGRESS NOTES
Chief Complaint:   Patient presents with:  Wrist Pain: left wrist    HPI:   This is a 48year old male resenting with cyst to left wrist does not seem to bother him has gotten bigger in the past 2 to 4 weeks describes it as swollen flesh-colored circular n Cancer Mother         breast cancer-  age 46   • Breast Cancer Mother    • ADHD Son    • Bipolar Disorder Son    • Renal Disease Brother         x2     Allergies:    Bactrim                 HIVES    Comment:TABS  Alc-Benzyl Alc-Sulf*    HIVES  Sulf Skin: Negative for color change, pallor, rash and wound. Allergic/Immunologic: Negative for environmental allergies, food allergies and immunocompromised state. Neurological: Negative for dizziness, weakness, light-headedness and headaches.    Hematol Neurological: He is alert and oriented to person, place, and time. No cranial nerve deficit or motor deficit. Gait normal.   Skin: Skin is warm and dry. No lesion and no rash noted. He is not diaphoretic. Left ganglion cyst, along distal ulna.     Psych

## 2020-05-11 NOTE — PATIENT INSTRUCTIONS
Ganglion Cyst: Hand    A ganglion cyst is a firm, fluid-filled lump that can suddenly appear on the front or back of the wrist or at the base of a finger. They are the most common type of mass or lump on the hand.  These cysts grow from normal tissue in t Your healthcare provider may just watch your ganglion cyst. Many shrink and become painless without treatment. Some disappear altogether.  If the cyst is unsightly or painful, or makes it hard for you to use your hand, your healthcare provider can treat it

## 2020-06-05 ENCOUNTER — APPOINTMENT (OUTPATIENT)
Dept: ULTRASOUND IMAGING | Age: 55
End: 2020-06-05
Attending: NURSE PRACTITIONER
Payer: COMMERCIAL

## 2020-06-05 ENCOUNTER — HOSPITAL ENCOUNTER (OUTPATIENT)
Age: 55
Discharge: HOME | End: 2020-06-05
Payer: COMMERCIAL

## 2020-06-05 VITALS
HEART RATE: 82 BPM | RESPIRATION RATE: 20 BRPM | DIASTOLIC BLOOD PRESSURE: 97 MMHG | SYSTOLIC BLOOD PRESSURE: 114 MMHG | OXYGEN SATURATION: 98 % | TEMPERATURE: 99 F

## 2020-06-05 DIAGNOSIS — N50.811 PAIN IN RIGHT TESTICLE: ICD-10-CM

## 2020-06-05 DIAGNOSIS — R10.9 FLANK PAIN: Primary | ICD-10-CM

## 2020-06-05 PROCEDURE — 93975 VASCULAR STUDY: CPT | Performed by: NURSE PRACTITIONER

## 2020-06-05 PROCEDURE — 99214 OFFICE O/P EST MOD 30 MIN: CPT

## 2020-06-05 PROCEDURE — 99204 OFFICE O/P NEW MOD 45 MIN: CPT

## 2020-06-05 PROCEDURE — 76770 US EXAM ABDO BACK WALL COMP: CPT | Performed by: NURSE PRACTITIONER

## 2020-06-05 PROCEDURE — 76870 US EXAM SCROTUM: CPT | Performed by: NURSE PRACTITIONER

## 2020-06-05 PROCEDURE — 81002 URINALYSIS NONAUTO W/O SCOPE: CPT

## 2020-06-05 NOTE — ED INITIAL ASSESSMENT (HPI)
Right groin pain -x 10 days. Pt was seen by pcp back in April  Had CT result negative. he called PCP again  Today and was advised to come here.     Right testicular pain - started wednesday and on certain positions

## 2020-06-05 NOTE — ED PROVIDER NOTES
Patient Seen in: THE [facility]      History   Patient presents with:  Penis/Scrotum Problem  Groin Pain    Stated Complaint: groin pain    50-year-old male presents today with complaints of right flank pain radiating down to the ab PAROTIDECTOMY Left 8/18/2016    Performed by Tracie Joshi MD at [facility] MAIN OR   • TYMPANOMASTOIDECTOMY W/O OSSICULAR CHAIN RECONSTRUCTION Right 4/6/2015    Performed by Penny Chavez MD at [address] [place] [address]                Family history reviewed Neurological:      Mental Status: He is alert. ED Course     Labs Reviewed   POCT URINALYSIS DIPSTICK - Normal         Us Kidney/bladder (cpt=76770)    Result Date: 6/5/2020  PROCEDURE:  US KIDNEY/BLADDER (CPT=76770)  COMPARISON:  None.   IN VARICOCELE:  None. OTHER:  None. CONCLUSION:  1. Unremarkable appearance of the testicles and epididymides. 2. Very small nonspecific hydroceles. 3. Continued clinical correlation recommended.      Dictated by: Jose Banda MD on 6/05/2020 at 1:41 PM     Fi

## 2020-06-18 DIAGNOSIS — F41.8 ANXIETY ABOUT HEALTH: ICD-10-CM

## 2020-06-19 RX ORDER — CLONAZEPAM 0.5 MG/1
TABLET ORAL
Qty: 30 TABLET | Refills: 0 | Status: SHIPPED | OUTPATIENT
Start: 2020-06-19 | End: 2020-07-28

## 2020-06-22 ENCOUNTER — TELEPHONE (OUTPATIENT)
Dept: FAMILY MEDICINE CLINIC | Facility: CLINIC | Age: 55
End: 2020-06-22

## 2020-06-22 NOTE — TELEPHONE ENCOUNTER
Patient called requesting a call back from a nurse. Patient still having pain right testicle to his right side. Patient said his stomach feels sensitive when he pushes on it. Patient said this has been going on for a few months.  Please call him back at [phone]

## 2020-06-22 NOTE — TELEPHONE ENCOUNTER
Called patient and spoke with him. Patient states that he is still having right sided abdominal pain. Patient was seen for this in March. Patient did not complete the U/S abdomen that was ordered. Patient states that his pain is about the same.   Padmini

## 2020-06-24 ENCOUNTER — PATIENT MESSAGE (OUTPATIENT)
Dept: FAMILY MEDICINE CLINIC | Facility: CLINIC | Age: 55
End: 2020-06-24

## 2020-06-24 ENCOUNTER — HOSPITAL ENCOUNTER (OUTPATIENT)
Dept: ULTRASOUND IMAGING | Age: 55
Discharge: HOME | End: 2020-06-24
Attending: FAMILY MEDICINE
Payer: COMMERCIAL

## 2020-06-24 ENCOUNTER — TELEPHONE (OUTPATIENT)
Dept: FAMILY MEDICINE CLINIC | Facility: CLINIC | Age: 55
End: 2020-06-24

## 2020-06-24 DIAGNOSIS — R10.9 RIGHT LATERAL ABDOMINAL PAIN: ICD-10-CM

## 2020-06-24 PROCEDURE — 76700 US EXAM ABDOM COMPLETE: CPT | Performed by: FAMILY MEDICINE

## 2020-06-24 NOTE — TELEPHONE ENCOUNTER
Patient's U/S was just over at 9am.  U/S is still marked as \"exam ended\" in Epic. No results available yet. Will hema for follow-up.

## 2020-06-24 NOTE — TELEPHONE ENCOUNTER
Pt called to follow up on US results.  I advised him that we will call as soon as results were finalized and reviewed by Dr. Mavis Escudero.

## 2020-06-25 NOTE — TELEPHONE ENCOUNTER
From: Maddie Courtney  To:  Adriana Cervantes MD  Sent: 6/24/2020 6:27 PM CDT  Subject: Test Results Question    Can you please give me a call I would like to know my results from this morning’s test. [phone] Thanks

## 2020-06-26 ENCOUNTER — PATIENT MESSAGE (OUTPATIENT)
Dept: FAMILY MEDICINE CLINIC | Facility: CLINIC | Age: 55
End: 2020-06-26

## 2020-06-26 NOTE — TELEPHONE ENCOUNTER
In previous mychart - you stated it was okay for patient to be off 5 days. I gave patient a work note to be off from 6/25/20-6/30/20. Now, he states that he worked this week and needs the work note to be updated to being off between 6/29/20-7/2/20.  Are

## 2020-06-26 NOTE — TELEPHONE ENCOUNTER
Regarding: Other  Contact: [phone]      ----- Message -----  From: Mena Landaverde RN  Sent: 6/26/2020   1:12 PM CDT  To: Karis Munguia MD  Subject: Other                                            ----- Message from Samantha Marinelli RN sent at 6/26/20

## 2020-06-26 NOTE — TELEPHONE ENCOUNTER
From: Kim Ragland  To: Hima Reilly MD  Sent: 6/26/2020 12:32 PM CDT  Subject: Other    I just need a doctors note off for Monday Tuesday Wednesday Thursday of next week. 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  Thanks

## 2020-06-26 NOTE — TELEPHONE ENCOUNTER
From: Isidra Mccray  To: Radha Preston MD  Sent: 6/26/2020 11:21 AM CDT  Subject: Other    I need my work note to say from the 25th to the 3 returning back to work on [address] .  Thanks

## 2020-07-24 DIAGNOSIS — Z88.9 H/O SEASONAL ALLERGIES: ICD-10-CM

## 2020-07-24 RX ORDER — LORATADINE AND PSEUDOEPHEDRINE SULFATE 10; 240 MG/1; MG/1
TABLET, EXTENDED RELEASE ORAL
Qty: 90 TABLET | Refills: 0 | Status: SHIPPED | OUTPATIENT
Start: 2020-07-24 | End: 2020-11-06

## 2020-07-24 NOTE — TELEPHONE ENCOUNTER
Medication(s) to Refill:   Requested Prescriptions     Pending Prescriptions Disp Refills   • CLARITIN-D 24 HOUR  MG Oral Tablet 24 Hr [Pharmacy Med Name: CLARITIN-D 24 HOUR TABLETS (NEW)] 90 tablet 0     Sig: TAKE ONE TABLET BY MOUTH ONCE A DAY

## 2020-07-27 DIAGNOSIS — F41.8 ANXIETY ABOUT HEALTH: ICD-10-CM

## 2020-07-28 RX ORDER — CLONAZEPAM 0.5 MG/1
TABLET ORAL
Qty: 30 TABLET | Refills: 0 | Status: SHIPPED | OUTPATIENT
Start: 2020-07-28 | End: 2021-02-26

## 2020-08-26 ENCOUNTER — TELEPHONE (OUTPATIENT)
Dept: HEMATOLOGY/ONCOLOGY | Facility: HOSPITAL | Age: 55
End: 2020-08-26

## 2020-08-26 NOTE — TELEPHONE ENCOUNTER
Trung Montes called saying he needs a note from the doctor that he can wear a certain type of mask due to a problem.

## 2020-08-27 ENCOUNTER — TELEPHONE (OUTPATIENT)
Dept: HEMATOLOGY/ONCOLOGY | Age: 55
End: 2020-08-27

## 2020-08-27 NOTE — TELEPHONE ENCOUNTER
Devang Cortez called saying he needs a note from the doctor that he can wear a certain type of mask due to a problem. Please fax toe note to [phone], please call patient this is regarding his job.

## 2020-08-28 ENCOUNTER — SOCIAL WORK SERVICES (OUTPATIENT)
Dept: HEMATOLOGY/ONCOLOGY | Facility: HOSPITAL | Age: 55
End: 2020-08-28

## 2020-08-28 ENCOUNTER — OFFICE VISIT (OUTPATIENT)
Dept: HEMATOLOGY/ONCOLOGY | Facility: HOSPITAL | Age: 55
End: 2020-08-28
Attending: SPECIALIST
Payer: COMMERCIAL

## 2020-08-28 VITALS
TEMPERATURE: 99 F | WEIGHT: 164.19 LBS | HEART RATE: 76 BPM | BODY MASS INDEX: 24.32 KG/M2 | HEIGHT: 69.02 IN | RESPIRATION RATE: 16 BRPM | SYSTOLIC BLOOD PRESSURE: 105 MMHG | DIASTOLIC BLOOD PRESSURE: 71 MMHG | OXYGEN SATURATION: 98 %

## 2020-08-28 DIAGNOSIS — C88.4 EXTRANODAL MARGINAL ZONE B-CELL LYMPHOMA OF MUCOSA-ASSOCIATED LYMPHOID TISSUE (MALT) (HCC): ICD-10-CM

## 2020-08-28 PROCEDURE — 99213 OFFICE O/P EST LOW 20 MIN: CPT | Performed by: SPECIALIST

## 2020-08-28 NOTE — PROGRESS NOTES
Patient is here today for follow up with Sabine Dukes for Extranodal marginal zone B cell (MALT) lymphoma of left parotid gland. Patient denies pain. Medication list and medical history were reviewed and updated.      Education Record    Learner:  Patient

## 2020-08-28 NOTE — PROGRESS NOTES
met with patient and completed letter to wear PPDai as requested; letter handed directly to patient.

## 2020-08-28 NOTE — PROGRESS NOTES
[facility] Progress Note      Patient Name: Carolann Lyons   YOB: 1965  Medical Record Number: [id number]  Attending Physician: Daniella Tomlinson M.D.      Date of Visit: 8/28/2020      Chief Complaint  Extranodal kirill CLONAZEPAM 0.5 MG Oral Tab, TAKE 1 TABLET BY MOUTH EVERY NIGHT AT BEDTIME AS NEEDED, Disp: 30 tablet, Rfl: 0  CLARITIN-D 24 HOUR  MG Oral Tablet 24 Hr, TAKE ONE TABLET BY MOUTH ONCE A DAY, Disp: 90 tablet, Rfl: 0  naproxen 500 MG Oral Tab, Take 1 t external nose normal; external ears normal.  Neck   Supple, without masses. Hematologic/Lymphatic No cervical, supraclavicular, axillary or inguinal lymphadenopathy; no petechiae or purpura.   Respiratory  Normal effort; no respiratory distress; lungs danna

## 2020-09-04 ENCOUNTER — APPOINTMENT (OUTPATIENT)
Dept: HEMATOLOGY/ONCOLOGY | Facility: HOSPITAL | Age: 55
End: 2020-09-04
Attending: SPECIALIST
Payer: COMMERCIAL

## 2020-09-28 RX ORDER — NEBIVOLOL HYDROCHLORIDE 2.5 MG/1
TABLET ORAL
Qty: 90 TABLET | Refills: 1 | Status: SHIPPED | OUTPATIENT
Start: 2020-09-28 | End: 2021-04-12

## 2020-09-28 NOTE — TELEPHONE ENCOUNTER
Medication(s) to Refill:   Requested Prescriptions     Pending Prescriptions Disp Refills   • BYSTOLIC 2.5 MG Oral Tab [Pharmacy Med Name: BYSTOLIC 5.7CW TABLETS] 90 tablet 1     Sig: TAKE 1 TABLET BY MOUTH EVERY DAY         Reason for Medication Refill be

## 2020-10-16 ENCOUNTER — APPOINTMENT (OUTPATIENT)
Dept: GENERAL RADIOLOGY | Age: 55
End: 2020-10-16
Attending: EMERGENCY MEDICINE
Payer: COMMERCIAL

## 2020-10-16 ENCOUNTER — HOSPITAL ENCOUNTER (EMERGENCY)
Age: 55
Discharge: HOME | End: 2020-10-16
Attending: EMERGENCY MEDICINE
Payer: COMMERCIAL

## 2020-10-16 VITALS
HEIGHT: 69 IN | BODY MASS INDEX: 23.7 KG/M2 | SYSTOLIC BLOOD PRESSURE: 120 MMHG | RESPIRATION RATE: 18 BRPM | HEART RATE: 67 BPM | OXYGEN SATURATION: 97 % | TEMPERATURE: 98 F | DIASTOLIC BLOOD PRESSURE: 88 MMHG | WEIGHT: 160 LBS

## 2020-10-16 DIAGNOSIS — S40.012A CONTUSION OF LEFT SHOULDER, INITIAL ENCOUNTER: ICD-10-CM

## 2020-10-16 DIAGNOSIS — S39.012A BACK STRAIN, INITIAL ENCOUNTER: ICD-10-CM
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
## 2020-10-16 PROCEDURE — 73030 X-RAY EXAM OF SHOULDER: CPT | Performed by: EMERGENCY MEDICINE

## 2020-10-16 PROCEDURE — 99284 EMERGENCY DEPT VISIT MOD MDM: CPT

## 2020-10-16 PROCEDURE — 72110 X-RAY EXAM L-2 SPINE 4/>VWS: CPT | Performed by: EMERGENCY MEDICINE

## 2020-10-16 RX ORDER — CYCLOBENZAPRINE HCL 10 MG
10 TABLET ORAL 3 TIMES DAILY PRN
Qty: 20 TABLET | Refills: 0 | Status: SHIPPED | OUTPATIENT
Start: 2020-10-16 | End: 2020-10-23
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
## 2020-11-06 DIAGNOSIS — Z88.9 H/O SEASONAL ALLERGIES: ICD-10-CM

## 2020-11-06 RX ORDER — LORATADINE AND PSEUDOEPHEDRINE SULFATE 10; 240 MG/1; MG/1
1 TABLET, EXTENDED RELEASE ORAL DAILY
Qty: 90 TABLET | Refills: 0 | Status: SHIPPED | OUTPATIENT
Start: 2020-11-06 | End: 2021-02-08

## 2020-11-06 NOTE — TELEPHONE ENCOUNTER
Medication(s) to Refill:   Requested Prescriptions     Pending Prescriptions Disp Refills   • Loratadine-Pseudoephedrine ER (CLARITIN-D 24 HOUR)  MG Oral Tablet 24 Hr 90 tablet 0     Sig: Take 1 tablet by mouth daily.          Reason for Medication Re

## 2020-11-19 DIAGNOSIS — F41.8 ANXIETY ABOUT HEALTH: ICD-10-CM

## 2020-11-19 RX ORDER — ESCITALOPRAM OXALATE 20 MG/1
20 TABLET ORAL DAILY
Qty: 90 TABLET | Refills: 0 | Status: SHIPPED | OUTPATIENT
Start: 2020-11-19 | End: 2021-03-03

## 2021-02-08 ENCOUNTER — TELEPHONE (OUTPATIENT)
Dept: FAMILY MEDICINE CLINIC | Facility: CLINIC | Age: 56
End: 2021-02-08

## 2021-02-08 NOTE — TELEPHONE ENCOUNTER
Kyara called regarding the refill for this medication     CLARITIN-D 24 HOUR)  MG Oral Tablet 24 Hr     Last OV 5/11/20     Stated has faxed and called and no response

## 2021-02-09 DIAGNOSIS — Z23 NEED FOR VACCINATION: ICD-10-CM

## 2021-02-25 NOTE — PROGRESS NOTES
THE [facility] Hematology Oncology Group Progress Note      Patient Name: Liliam Milan   YOB: 1965  Medical Record Number: [id number]  Attending Physician: Susan Bach M.D.      Date of Visit: 2/26/2021      Chief Complaint  Extranodal kirill Loratadine-Pseudoephedrine ER (CLARITIN-D 24 HOUR)  MG Oral Tablet 24 Hr, Take 1 tablet by mouth daily. , Disp: 90 tablet, Rfl: 0    •  escitalopram 20 MG Oral Tab, Take 1 tablet (20 mg total) by mouth daily. , Disp: 90 tablet, Rfl: 0    •  BYSTOLIC 2. for this or any previous visit (from the past 24 hour(s)). Impression and Plan   1. MALT lymphoma: There is no clinical evidence of definitive relapsed disease. Continue active surveillance.      Planned Follow Up   Patient will return for follow up i

## 2021-02-26 ENCOUNTER — OFFICE VISIT (OUTPATIENT)
Dept: HEMATOLOGY/ONCOLOGY | Facility: HOSPITAL | Age: 56
End: 2021-02-26
Attending: SPECIALIST
Payer: COMMERCIAL

## 2021-02-26 VITALS
HEIGHT: 69.02 IN | TEMPERATURE: 97 F | HEART RATE: 74 BPM | RESPIRATION RATE: 18 BRPM | WEIGHT: 168 LBS | OXYGEN SATURATION: 99 % | DIASTOLIC BLOOD PRESSURE: 80 MMHG | BODY MASS INDEX: 24.88 KG/M2 | SYSTOLIC BLOOD PRESSURE: 121 MMHG

## 2021-02-26 DIAGNOSIS — C88.4 EXTRANODAL MARGINAL ZONE B-CELL LYMPHOMA OF MUCOSA-ASSOCIATED LYMPHOID TISSUE (MALT) (HCC): ICD-10-CM

## 2021-02-26 PROCEDURE — 99212 OFFICE O/P EST SF 10 MIN: CPT | Performed by: SPECIALIST

## 2021-02-26 NOTE — PROGRESS NOTES
Patient is here today for follow up with Donald Davis for MALT Lymphoma. Patient denies pain. Stated he is feeling good. Medication list and  medical history were reviewed and updated.      Education Record    Learner:  Patient    Disease / Diagnosis: MALT Ly

## 2021-02-27 DIAGNOSIS — F41.8 ANXIETY ABOUT HEALTH: ICD-10-CM

## 2021-03-01 RX ORDER — ESCITALOPRAM OXALATE 20 MG/1
TABLET ORAL
Qty: 90 TABLET | Refills: 0 | OUTPATIENT
Start: 2021-03-01

## 2021-03-05 ENCOUNTER — APPOINTMENT (OUTPATIENT)
Dept: HEMATOLOGY/ONCOLOGY | Facility: HOSPITAL | Age: 56
End: 2021-03-05
Attending: SPECIALIST
Payer: COMMERCIAL

## 2021-04-13 RX ORDER — NEBIVOLOL 2.5 MG/1
2.5 TABLET ORAL DAILY
Qty: 90 TABLET | Refills: 0 | Status: SHIPPED | OUTPATIENT
Start: 2021-04-13 | End: 2021-07-12

## 2021-04-28 ENCOUNTER — OFFICE VISIT (OUTPATIENT)
Dept: FAMILY MEDICINE CLINIC | Facility: CLINIC | Age: 56
End: 2021-04-28
Payer: COMMERCIAL

## 2021-04-28 VITALS
RESPIRATION RATE: 16 BRPM | OXYGEN SATURATION: 98 % | HEART RATE: 74 BPM | TEMPERATURE: 97 F | BODY MASS INDEX: 25.03 KG/M2 | WEIGHT: 169 LBS | SYSTOLIC BLOOD PRESSURE: 120 MMHG | HEIGHT: 69 IN | DIASTOLIC BLOOD PRESSURE: 80 MMHG

## 2021-04-28 DIAGNOSIS — Z13.21 SCREENING FOR ENDOCRINE, NUTRITIONAL, METABOLIC AND IMMUNITY DISORDER: ICD-10-CM

## 2021-04-28 DIAGNOSIS — Z00.00 ANNUAL PHYSICAL EXAM: Primary | ICD-10-CM

## 2021-04-28 DIAGNOSIS — Z13.228 SCREENING FOR ENDOCRINE, NUTRITIONAL, METABOLIC AND IMMUNITY DISORDER: ICD-10-CM

## 2021-04-28 DIAGNOSIS — R68.2 DRY MOUTH: ICD-10-CM

## 2021-04-28 DIAGNOSIS — Z12.5 SCREENING PSA (PROSTATE SPECIFIC ANTIGEN): ICD-10-CM

## 2021-04-28 DIAGNOSIS — Z13.29 SCREENING FOR ENDOCRINE, NUTRITIONAL, METABOLIC AND IMMUNITY DISORDER: ICD-10-CM

## 2021-04-28 DIAGNOSIS — Z12.11 SCREEN FOR COLON CANCER: ICD-10-CM

## 2021-04-28 DIAGNOSIS — Z13.0 SCREENING FOR ENDOCRINE, NUTRITIONAL, METABOLIC AND IMMUNITY DISORDER: ICD-10-CM

## 2021-04-28 PROBLEM — R74.8 ELEVATED ALKALINE PHOSPHATASE LEVEL: Status: RESOLVED | Noted: 2017-01-22 | Resolved: 2021-04-28

## 2021-04-28 PROCEDURE — 3079F DIAST BP 80-89 MM HG: CPT | Performed by: FAMILY MEDICINE

## 2021-04-28 PROCEDURE — 3074F SYST BP LT 130 MM HG: CPT | Performed by: FAMILY MEDICINE

## 2021-04-28 PROCEDURE — 99396 PREV VISIT EST AGE 40-64: CPT | Performed by: FAMILY MEDICINE

## 2021-04-28 PROCEDURE — 3008F BODY MASS INDEX DOCD: CPT | Performed by: FAMILY MEDICINE

## 2021-04-28 RX ORDER — PILOCARPINE HYDROCHLORIDE 5 MG/1
5 TABLET, FILM COATED ORAL 2 TIMES DAILY PRN
Qty: 60 TABLET | Refills: 1 | Status: SHIPPED | OUTPATIENT
Start: 2021-04-28 | End: 2021-04-29

## 2021-04-28 NOTE — PROGRESS NOTES
HPI/Subjective:   Juanis Villagran is a 55year old male who presents for Annual     Presenting for annual physical. S/p treatment for MALT, in remission with surveillance.       History/Other:   Chief Complaint Reviewed and Verified  No Further Nursing N effort is normal.      Breath sounds: Normal breath sounds. Abdominal:      General: Bowel sounds are normal.      Palpations: Abdomen is soft. Skin:     General: Skin is warm and dry.    Neurological:      Mental Status: He is alert and oriented to per

## 2021-04-29 RX ORDER — PILOCARPINE HYDROCHLORIDE 5 MG/1
TABLET, FILM COATED ORAL
Qty: 180 TABLET | Refills: 0 | Status: SHIPPED | OUTPATIENT
Start: 2021-04-29 | End: 2021-06-21

## 2021-05-05 ENCOUNTER — TELEPHONE (OUTPATIENT)
Dept: FAMILY MEDICINE CLINIC | Facility: CLINIC | Age: 56
End: 2021-05-05

## 2021-05-05 ENCOUNTER — LAB ENCOUNTER (OUTPATIENT)
Dept: LAB | Age: 56
End: 2021-05-05
Attending: FAMILY MEDICINE
Payer: COMMERCIAL

## 2021-05-05 DIAGNOSIS — Z13.0 SCREENING FOR ENDOCRINE, NUTRITIONAL, METABOLIC AND IMMUNITY DISORDER: ICD-10-CM

## 2021-05-05 DIAGNOSIS — Z13.21 SCREENING FOR ENDOCRINE, NUTRITIONAL, METABOLIC AND IMMUNITY DISORDER: ICD-10-CM

## 2021-05-05 DIAGNOSIS — Z13.228 SCREENING FOR ENDOCRINE, NUTRITIONAL, METABOLIC AND IMMUNITY DISORDER: ICD-10-CM

## 2021-05-05 DIAGNOSIS — Z13.29 SCREENING FOR ENDOCRINE, NUTRITIONAL, METABOLIC AND IMMUNITY DISORDER: ICD-10-CM

## 2021-05-05 DIAGNOSIS — Z12.5 SCREENING PSA (PROSTATE SPECIFIC ANTIGEN): ICD-10-CM

## 2021-05-05 PROCEDURE — 84153 ASSAY OF PSA TOTAL: CPT | Performed by: FAMILY MEDICINE

## 2021-05-05 PROCEDURE — 80050 GENERAL HEALTH PANEL: CPT | Performed by: FAMILY MEDICINE

## 2021-05-05 PROCEDURE — 80061 LIPID PANEL: CPT | Performed by: FAMILY MEDICINE

## 2021-05-06 NOTE — TELEPHONE ENCOUNTER
I can order labs for him, but he saw rheumatology in 2016 and was recommended to start Plaquenil for Sjogren with inflammatory arthritis (sounds like questionable RA?).  He would likely benefit from rheumatology f/u and if his lab work were to come back abn

## 2021-05-06 NOTE — TELEPHONE ENCOUNTER
Called patient and spoke with him. Patient is agreeable to follow-up with rheumatology. [facility] provided to patient. Patient states understanding.

## 2021-05-11 ENCOUNTER — TELEPHONE (OUTPATIENT)
Dept: FAMILY MEDICINE CLINIC | Facility: CLINIC | Age: 56
End: 2021-05-11

## 2021-05-11 DIAGNOSIS — Z88.9 H/O SEASONAL ALLERGIES: ICD-10-CM

## 2021-05-11 RX ORDER — LORATADINE AND PSEUDOEPHEDRINE SULFATE 10; 240 MG/1; MG/1
1 TABLET, EXTENDED RELEASE ORAL DAILY
Qty: 90 TABLET | Refills: 0 | Status: SHIPPED | OUTPATIENT
Start: 2021-05-11 | End: 2021-05-14

## 2021-05-11 NOTE — TELEPHONE ENCOUNTER
Medication(s) to Refill:   Requested Prescriptions     Pending Prescriptions Disp Refills   • Loratadine-Pseudoephedrine ER (CLARITIN-D 24 HOUR)  MG Oral Tablet 24 Hr 90 tablet 0     Sig: Take 1 tablet by mouth daily.        Last Time Medication was F

## 2021-05-14 ENCOUNTER — TELEPHONE (OUTPATIENT)
Dept: FAMILY MEDICINE CLINIC | Facility: CLINIC | Age: 56
End: 2021-05-14

## 2021-05-14 DIAGNOSIS — Z88.9 H/O SEASONAL ALLERGIES: ICD-10-CM

## 2021-05-14 RX ORDER — LORATADINE AND PSEUDOEPHEDRINE SULFATE 10; 240 MG/1; MG/1
1 TABLET, EXTENDED RELEASE ORAL DAILY
Qty: 90 TABLET | Refills: 0 | OUTPATIENT
Start: 2021-05-14

## 2021-05-14 NOTE — TELEPHONE ENCOUNTER
Pt states [facility] is \"out\" of Rx-Loratadine-Pseudoephedrine ER (CLARITIN-D 24 HOUR)  MG Oral Tablet 24 Hr and \"doesn't know\" when they will be getting it in.    Pt now requesting rx be sent to [address]

## 2021-05-14 NOTE — TELEPHONE ENCOUNTER
Spoke with Juliet Hinojosa at [facility]. Juliet Hinojosa states that the patient has not picked up the medication. Canceled the order at [facility]. Order pending for Bryn Nicholson. Please approve or deny. Thank you.

## 2021-05-14 NOTE — TELEPHONE ENCOUNTER
Patient called again for this medicine, he needs to have it, stated this needs to be at [facility] in [place] at [address] [place] [address]     The longer he is off of it, he is getting sicker     Loratadine-Pseudoephedrine ER (CLARITIN-D 24 HOUR)  MG Oral Tablet 24 Hr

## 2021-05-14 NOTE — TELEPHONE ENCOUNTER
----- Message from Natasha Meyer MD sent at 5/14/2021 12:33 PM CDT -----  [facility] .

## 2021-05-17 DIAGNOSIS — F41.8 ANXIETY ABOUT HEALTH: ICD-10-CM

## 2021-05-17 RX ORDER — CLONAZEPAM 0.5 MG/1
TABLET ORAL
Qty: 30 TABLET | Refills: 0 | Status: SHIPPED | OUTPATIENT
Start: 2021-05-17

## 2021-05-17 RX ORDER — LORATADINE AND PSEUDOEPHEDRINE SULFATE 10; 240 MG/1; MG/1
1 TABLET, EXTENDED RELEASE ORAL DAILY
Qty: 90 TABLET | Refills: 0 | Status: SHIPPED | OUTPATIENT
Start: 2021-05-17 | End: 2021-08-31

## 2021-05-20 ENCOUNTER — TELEPHONE (OUTPATIENT)
Dept: FAMILY MEDICINE CLINIC | Facility: CLINIC | Age: 56
End: 2021-05-20

## 2021-05-20 DIAGNOSIS — M79.10 MYALGIA: Primary | ICD-10-CM

## 2021-05-20 NOTE — TELEPHONE ENCOUNTER
See 5/5/21 TE. Patient was referred to rheumatology for questionable RA. Patient was previously tested several years ago. Patient is scheduled to see Dr. Shawna Richards on 6/25/21.   Patient is asking for labs prior to his appointment to see \"how bad\" his autoi

## 2021-05-20 NOTE — TELEPHONE ENCOUNTER
Pt questioning if  would place labs to check how \"bad\" his autoimmune disease has gotten before he sees Anthony Common, since Anthony Common was not available in till 6/25/21. Pls advise.

## 2021-05-24 ENCOUNTER — LAB ENCOUNTER (OUTPATIENT)
Dept: LAB | Age: 56
End: 2021-05-24
Attending: FAMILY MEDICINE
Payer: COMMERCIAL

## 2021-05-24 ENCOUNTER — PATIENT MESSAGE (OUTPATIENT)
Dept: FAMILY MEDICINE CLINIC | Facility: CLINIC | Age: 56
End: 2021-05-24

## 2021-05-24 DIAGNOSIS — M79.10 MYALGIA: ICD-10-CM

## 2021-05-24 PROCEDURE — 80053 COMPREHEN METABOLIC PANEL: CPT | Performed by: FAMILY MEDICINE

## 2021-05-24 PROCEDURE — 86235 NUCLEAR ANTIGEN ANTIBODY: CPT | Performed by: FAMILY MEDICINE

## 2021-05-24 PROCEDURE — 85025 COMPLETE CBC W/AUTO DIFF WBC: CPT | Performed by: FAMILY MEDICINE

## 2021-05-24 PROCEDURE — 87476 LYME DIS DNA AMP PROBE: CPT | Performed by: FAMILY MEDICINE

## 2021-05-24 PROCEDURE — 86200 CCP ANTIBODY: CPT | Performed by: FAMILY MEDICINE

## 2021-05-24 NOTE — TELEPHONE ENCOUNTER
From: Alexa Davis  To: Julian Levi MD  Sent: 5/24/2021 4:40 PM CDT  Subject: Test Results Question    Do you know how long it takes to get my results back for my blood test for my HIRAM ? I got the other ones just not that one.  Thanks

## 2021-05-26 ENCOUNTER — TELEPHONE (OUTPATIENT)
Dept: FAMILY MEDICINE CLINIC | Facility: CLINIC | Age: 56
End: 2021-05-26

## 2021-05-26 NOTE — TELEPHONE ENCOUNTER
----- Message from Nancy Ellis MD sent at 5/26/2021  2:38 PM CDT -----  There's been increase in his AB for Sjogren's, sound like progression of disease.  He should see dr. Nory Cagle was previous rheum), if not he could see [facility] rheum, jose, and new partn

## 2021-05-26 NOTE — TELEPHONE ENCOUNTER
Called patient and spoke with him. Went over results below. Patient states understanding. Patient states that he was not fasting. Per Dr. Suzanne Gambino no need to repeat labs since not fasting.   Patient also already has an appointment scheduled with TAMARA jurado

## 2021-06-07 DIAGNOSIS — F41.8 ANXIETY ABOUT HEALTH: ICD-10-CM

## 2021-06-07 RX ORDER — ESCITALOPRAM OXALATE 20 MG/1
20 TABLET ORAL DAILY
Qty: 90 TABLET | Refills: 1 | Status: SHIPPED | OUTPATIENT
Start: 2021-06-07 | End: 2021-12-07

## 2021-06-07 NOTE — TELEPHONE ENCOUNTER
Escitalopram refill request.  LOV 4/28. His previous PCP was filling this prior. Please approve or deny pending Rx. Thank you.

## 2021-06-21 ENCOUNTER — OFFICE VISIT (OUTPATIENT)
Dept: RHEUMATOLOGY | Facility: CLINIC | Age: 56
End: 2021-06-21
Payer: COMMERCIAL

## 2021-06-21 VITALS
HEIGHT: 69 IN | HEART RATE: 94 BPM | DIASTOLIC BLOOD PRESSURE: 80 MMHG | WEIGHT: 168 LBS | BODY MASS INDEX: 24.88 KG/M2 | SYSTOLIC BLOOD PRESSURE: 121 MMHG | TEMPERATURE: 97 F

## 2021-06-21 DIAGNOSIS — Z51.81 THERAPEUTIC DRUG MONITORING: ICD-10-CM

## 2021-06-21 DIAGNOSIS — Z11.59 NEED FOR HEPATITIS B SCREENING TEST: ICD-10-CM

## 2021-06-21 DIAGNOSIS — R76.8 RHEUMATOID FACTOR POSITIVE: ICD-10-CM

## 2021-06-21 DIAGNOSIS — Z11.4 SCREENING FOR HIV (HUMAN IMMUNODEFICIENCY VIRUS): ICD-10-CM

## 2021-06-21 DIAGNOSIS — M35.00 SJOGREN'S SYNDROME WITHOUT EXTRAGLANDULAR INVOLVEMENT (HCC): Primary | ICD-10-CM

## 2021-06-21 DIAGNOSIS — R68.2 DRY MOUTH: ICD-10-CM

## 2021-06-21 DIAGNOSIS — Z11.59 NEED FOR HEPATITIS C SCREENING TEST: ICD-10-CM

## 2021-06-21 DIAGNOSIS — Z11.1 SCREENING FOR TUBERCULOSIS: ICD-10-CM

## 2021-06-21 DIAGNOSIS — R76.8 POSITIVE ANA (ANTINUCLEAR ANTIBODY): ICD-10-CM

## 2021-06-21 PROCEDURE — 3008F BODY MASS INDEX DOCD: CPT | Performed by: INTERNAL MEDICINE

## 2021-06-21 PROCEDURE — 99244 OFF/OP CNSLTJ NEW/EST MOD 40: CPT | Performed by: INTERNAL MEDICINE

## 2021-06-21 PROCEDURE — 3079F DIAST BP 80-89 MM HG: CPT | Performed by: INTERNAL MEDICINE

## 2021-06-21 PROCEDURE — 3074F SYST BP LT 130 MM HG: CPT | Performed by: INTERNAL MEDICINE

## 2021-06-21 RX ORDER — CEVIMELINE HYDROCHLORIDE 30 MG/1
CAPSULE ORAL
Qty: 249 CAPSULE | Refills: 1 | Status: SHIPPED | OUTPATIENT
Start: 2021-06-21 | End: 2021-09-19

## 2021-06-21 NOTE — PATIENT INSTRUCTIONS
Sjogren's Plan:      Dry eyes, try OTC eye drops: Systane, Refresh or Blink. Preservative-free (individually packaged) are the best as preservatives can dry the eyes out. Dry Mouth Remedies: [email or web address]. org/member-community/product-direc

## 2021-06-21 NOTE — PROGRESS NOTES
Rheumatology New Patient Note  =====================================================================================================      Date of visit: 6/21/2021  ? Patient presents with:  Establish Care: New pt, referred by PCP for sjogrens.  DX 6-7 lesions, oral ulcers, pleuritic chest pain, arthritis, seizures/psychosis, Raynaud's, dry eyes/mouth, or blood clots.     14 point ROS negative except noted above    Medications:  Cevimeline HCl 30 MG Oral Cap, Take 1 capsule (30 mg total) by mouth every ev Breast Cancer Mother    • ADHD Son    • Bipolar Disorder Son    • Renal Disease Brother         x2     Social History:  Social History    Tobacco Use      Smoking status: Never Smoker      Smokeless tobacco: Never Used    Vaping Use      Vaping Use: Never 02/21/2013    LYMPHABS 862 02/21/2013    EOSABS 209 02/21/2013    BASABS 15 02/21/2013    NEUT 66.9 02/21/2013    LYMPH 16.9 02/21/2013    MON 11.8 02/21/2013    EOS 4.1 02/21/2013    BASO 0.3 02/21/2013    NEPERCENT 63.8 05/24/2021    LYPERCENT 19.8 05/24 abd/pelvis reviewed in epic  Radiology review:        =====================================================================================================  Assessment and Plan    Assessment:  Positive HIRAM (antinuclear antibody)  (primary encounter diagnos Future  -     Belita Spatz; Future  -     URINALYSIS WITH CULTURE REFLEX; Future  -     CREATININE, URINE, RANDOM; Future  -     CBC WITH DIFFERENTIAL WITH PLATELET; Future  -     COMP METABOLIC PANEL (14);  Future  -     COMPLEMENT C3, SERUM (around 8/21/2021). The above plan of care, diagnosis, orders, and follow-up were discussed with the patient. Questions related to this recommended plan of care were answered. Thank you for referring this delightful patient to me.  Please feel free

## 2021-07-12 RX ORDER — NEBIVOLOL 2.5 MG/1
2.5 TABLET ORAL DAILY
Qty: 90 TABLET | Refills: 0 | Status: SHIPPED | OUTPATIENT
Start: 2021-07-12 | End: 2021-10-20

## 2021-07-12 NOTE — TELEPHONE ENCOUNTER
Last office visit:   4/28/21    Appointment scheduled with:     Requested medication: BYSTOLIC 2.5 MG Oral Tab    Pharmacy: [facility] in [place]

## 2021-08-30 DIAGNOSIS — Z88.9 H/O SEASONAL ALLERGIES: ICD-10-CM

## 2021-08-31 RX ORDER — LORATADINE AND PSEUDOEPHEDRINE SULFATE 10; 240 MG/1; MG/1
TABLET, EXTENDED RELEASE ORAL
Qty: 90 TABLET | Refills: 0 | Status: SHIPPED | OUTPATIENT
Start: 2021-08-31

## 2021-09-22 ENCOUNTER — OFFICE VISIT (OUTPATIENT)
Dept: FAMILY MEDICINE CLINIC | Facility: CLINIC | Age: 56
End: 2021-09-22
Payer: COMMERCIAL

## 2021-09-22 VITALS
SYSTOLIC BLOOD PRESSURE: 118 MMHG | BODY MASS INDEX: 24.44 KG/M2 | DIASTOLIC BLOOD PRESSURE: 80 MMHG | OXYGEN SATURATION: 98 % | WEIGHT: 165 LBS | HEART RATE: 75 BPM | HEIGHT: 69 IN | RESPIRATION RATE: 16 BRPM

## 2021-09-22 DIAGNOSIS — R05.3 PERSISTENT DRY COUGH: ICD-10-CM

## 2021-09-22 DIAGNOSIS — R68.2 DRY MOUTH: Primary | ICD-10-CM

## 2021-09-22 PROCEDURE — 99213 OFFICE O/P EST LOW 20 MIN: CPT | Performed by: FAMILY MEDICINE

## 2021-09-22 PROCEDURE — 3079F DIAST BP 80-89 MM HG: CPT | Performed by: FAMILY MEDICINE

## 2021-09-22 PROCEDURE — 3008F BODY MASS INDEX DOCD: CPT | Performed by: FAMILY MEDICINE

## 2021-09-22 PROCEDURE — 3074F SYST BP LT 130 MM HG: CPT | Performed by: FAMILY MEDICINE

## 2021-09-22 RX ORDER — DEXTROMETHORPHAN HYDROBROMIDE AND PROMETHAZINE HYDROCHLORIDE 15; 6.25 MG/5ML; MG/5ML
5 SYRUP ORAL 2 TIMES DAILY PRN
Qty: 180 ML | Refills: 1 | Status: SHIPPED | OUTPATIENT
Start: 2021-09-22

## 2021-09-22 RX ORDER — ALBUTEROL SULFATE 90 UG/1
2 AEROSOL, METERED RESPIRATORY (INHALATION) 2 TIMES DAILY PRN
Qty: 1 EACH | Refills: 1 | Status: SHIPPED | OUTPATIENT
Start: 2021-09-22 | End: 2022-09-22

## 2021-09-22 NOTE — PROGRESS NOTES
Subjective:   Alexa Davis is a 55year old male who presents for Cough (pt c/o of cough x 1 month. pt denies SOB)     The patient wishes to address some  upper respiratory symptoms. Has had them for 2  months.  Patient reports congestion, dry cou body mass index is 24.37 kg/m² as calculated from the following:    Height as of this encounter: 5' 9\" (1.753 m). Weight as of this encounter: 165 lb (74.8 kg). Physical Exam  Constitutional:       Appearance: He is well-developed.    Cardiovascular:

## 2021-10-12 ENCOUNTER — LAB ENCOUNTER (OUTPATIENT)
Dept: LAB | Age: 56
End: 2021-10-12
Attending: INTERNAL MEDICINE
Payer: COMMERCIAL

## 2021-10-12 DIAGNOSIS — Z11.4 SCREENING FOR HIV (HUMAN IMMUNODEFICIENCY VIRUS): ICD-10-CM

## 2021-10-12 DIAGNOSIS — R76.8 POSITIVE ANA (ANTINUCLEAR ANTIBODY): ICD-10-CM

## 2021-10-12 DIAGNOSIS — Z51.81 THERAPEUTIC DRUG MONITORING: ICD-10-CM

## 2021-10-12 DIAGNOSIS — Z11.59 NEED FOR HEPATITIS B SCREENING TEST: ICD-10-CM

## 2021-10-12 DIAGNOSIS — Z11.59 NEED FOR HEPATITIS C SCREENING TEST: ICD-10-CM

## 2021-10-12 PROCEDURE — 83883 ASSAY NEPHELOMETRY NOT SPEC: CPT

## 2021-10-12 PROCEDURE — 86147 CARDIOLIPIN ANTIBODY EA IG: CPT

## 2021-10-12 PROCEDURE — 86706 HEP B SURFACE ANTIBODY: CPT

## 2021-10-12 PROCEDURE — 86140 C-REACTIVE PROTEIN: CPT

## 2021-10-12 PROCEDURE — 86146 BETA-2 GLYCOPROTEIN ANTIBODY: CPT

## 2021-10-12 PROCEDURE — 87389 HIV-1 AG W/HIV-1&-2 AB AG IA: CPT

## 2021-10-12 PROCEDURE — 85652 RBC SED RATE AUTOMATED: CPT

## 2021-10-12 PROCEDURE — 86160 COMPLEMENT ANTIGEN: CPT

## 2021-10-12 PROCEDURE — 85705 THROMBOPLASTIN INHIBITION: CPT

## 2021-10-12 PROCEDURE — 85613 RUSSELL VIPER VENOM DILUTED: CPT

## 2021-10-12 PROCEDURE — 86334 IMMUNOFIX E-PHORESIS SERUM: CPT

## 2021-10-12 PROCEDURE — 86480 TB TEST CELL IMMUN MEASURE: CPT

## 2021-10-12 PROCEDURE — 86704 HEP B CORE ANTIBODY TOTAL: CPT

## 2021-10-12 PROCEDURE — 84165 PROTEIN E-PHORESIS SERUM: CPT

## 2021-10-12 PROCEDURE — 87340 HEPATITIS B SURFACE AG IA: CPT

## 2021-10-12 PROCEDURE — 86803 HEPATITIS C AB TEST: CPT

## 2021-10-12 PROCEDURE — 80053 COMPREHEN METABOLIC PANEL: CPT

## 2021-10-12 PROCEDURE — 86038 ANTINUCLEAR ANTIBODIES: CPT

## 2021-10-12 PROCEDURE — 85025 COMPLETE CBC W/AUTO DIFF WBC: CPT

## 2021-10-12 PROCEDURE — 85610 PROTHROMBIN TIME: CPT

## 2021-10-12 PROCEDURE — 85732 THROMBOPLASTIN TIME PARTIAL: CPT

## 2021-10-12 PROCEDURE — 36415 COLL VENOUS BLD VENIPUNCTURE: CPT

## 2021-10-18 NOTE — PROGRESS NOTES
[employer]    Inflammation is low. Lupus anticoagulant, anticardiolipin, and beta-2 glycoprotein are autoimmune tests associated with blood clots; these are negative which is good.    HIRAM titer is at a high level, consistent with and provi

## 2021-10-20 ENCOUNTER — TELEPHONE (OUTPATIENT)
Dept: FAMILY MEDICINE CLINIC | Facility: CLINIC | Age: 56
End: 2021-10-20

## 2021-10-20 RX ORDER — NEBIVOLOL 2.5 MG/1
2.5 TABLET ORAL DAILY
Qty: 90 TABLET | Refills: 0 | Status: SHIPPED | OUTPATIENT
Start: 2021-10-20 | End: 2021-12-30

## 2021-10-20 NOTE — TELEPHONE ENCOUNTER
Patient Karena Levi calling to request status of refill of     Nebivolol HCl (BYSTOLIC) 2.5 MG Oral Tab 90 tablet 0 7/12/2021     Sig - Route:  Take 1 tablet (2.5 mg total) by mouth daily. - Oral      States [facility] pharmacy told him that they have contacted the

## 2021-10-25 ENCOUNTER — TELEPHONE (OUTPATIENT)
Dept: RHEUMATOLOGY | Facility: CLINIC | Age: 56
End: 2021-10-25

## 2021-10-25 NOTE — TELEPHONE ENCOUNTER
----- Message from Ronnei Valdovinos MD sent at 10/17/2021  9:30 PM CDT -----  Dear Natanael Schwarz    Inflammation is low. Lupus anticoagulant, anticardiolipin, and beta-2 glycoprotein are autoimmune tests associated with blood clots; these are negative which is good. HIRAM titer is at a high level, consistent with and providing more support for Sjogren's. 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 NO evidence pointing to current lymphoma based on this blood test which is good  Hepatitis B/C, tuberculosis testing is negative. Liver tests, kidney function are stable. Blood counts are stable. Lymphocytes are a bit low (been low before too) this can be seen in Sjogren's. Overall, labs are stable.      I hope you are well,    [facility]  10/17/2021

## 2021-11-09 ENCOUNTER — OFFICE VISIT (OUTPATIENT)
Dept: RHEUMATOLOGY | Facility: CLINIC | Age: 56
End: 2021-11-09
Payer: COMMERCIAL

## 2021-11-09 VITALS
BODY MASS INDEX: 23.99 KG/M2 | HEIGHT: 69 IN | DIASTOLIC BLOOD PRESSURE: 75 MMHG | WEIGHT: 162 LBS | SYSTOLIC BLOOD PRESSURE: 103 MMHG | OXYGEN SATURATION: 97 % | HEART RATE: 84 BPM

## 2021-11-09 DIAGNOSIS — M35.00 SJOGREN SYNDROME, UNSPECIFIED (HCC): Primary | ICD-10-CM

## 2021-11-09 DIAGNOSIS — Z51.81 THERAPEUTIC DRUG MONITORING: ICD-10-CM

## 2021-11-09 DIAGNOSIS — R68.2 DRY MOUTH: ICD-10-CM

## 2021-11-09 DIAGNOSIS — R05.9 COUGH: ICD-10-CM

## 2021-11-09 DIAGNOSIS — H04.129 DRY EYE: ICD-10-CM

## 2021-11-09 PROCEDURE — 99215 OFFICE O/P EST HI 40 MIN: CPT | Performed by: INTERNAL MEDICINE

## 2021-11-09 PROCEDURE — 3078F DIAST BP <80 MM HG: CPT | Performed by: INTERNAL MEDICINE

## 2021-11-09 PROCEDURE — 3074F SYST BP LT 130 MM HG: CPT | Performed by: INTERNAL MEDICINE

## 2021-11-09 PROCEDURE — 3008F BODY MASS INDEX DOCD: CPT | Performed by: INTERNAL MEDICINE

## 2021-11-09 RX ORDER — PREDNISONE 1 MG/1
TABLET ORAL
Qty: 118 TABLET | Refills: 0 | Status: SHIPPED | OUTPATIENT
Start: 2021-11-09 | End: 2022-02-07

## 2021-11-09 RX ORDER — CEVIMELINE HYDROCHLORIDE 30 MG/1
CAPSULE ORAL
Qty: 249 CAPSULE | Refills: 1 | Status: SHIPPED | OUTPATIENT
Start: 2021-11-09 | End: 2022-02-07

## 2021-11-09 NOTE — PATIENT INSTRUCTIONS
Cevemeline (evoxac): This will help with the dry mouth.  Take 1 capsule (30 mg total) by mouth every evening for 7 days, THEN 1 capsule (30 mg total) 2 (two) times a day for 7 days, THEN 1 capsule (30 mg total) 3 (three) times daily    Prednisone:  Take http://wise.org/

## 2021-11-09 NOTE — PROGRESS NOTES
Rheumatology f/u Patient Note  =====================================================================================================    Patient presents with:   Follow - Up: LOV 06/21, has not been doing great. having really bad dry mouth, and body sore clots.    ==============================================================================================================  Today's Visit: 11/09/21    Fingers are more painful. Hard to get on rings. We are achy at the end of the day.   Back pain is off and on 1  Diclofenac Sodium (VOLTAREN) 1 % Transdermal Gel, 1 application to affected area bid prn, Disp: 100 g, Rfl: 0  Multiple Vitamins-Minerals (MULTIVITAMIN ADULT OR), Take 1 tablet by mouth daily. , Disp: , Rfl:   Vitamin D3 2000 UNITS Oral Cap, Take 2,000 U clear. Sclera are anicteric. No oral ulcers Tongue is midline with no lesions. The oral cavity is clear. Geographic tongue and tongue fissuring. Neck: Supple. No neck masses. No thyromegaly. No LAD, parotid or submandicular gland palpated.  Sequel 10/12/2021         Lab Results   Component Value Date    HIRAM POSITIVE (H) 02/17/2014    ANAS Positive (A) 12/01/2015     Lab Results   Component Value Date     (H) 05/24/2021    ANTISSA 954 (H) 06/10/2014     (H) 05/24/2021    ANTISSB 1,258 ( pilocarpine. High risk medication labs including CMP and CBC w/ diff reviewed from 10/2021. Results are stable.      Lab Results   Component Value Date    HBCAB Nonreactive  10/12/2021    HBSAG Nonreactive  10/12/2021    HBSABQL Nonreactive  10/12/2021 day for 7 days, THEN 1 capsule (30 mg total) 3 (three) times daily. Cough  -     XR CHEST PA + LAT CHEST (CPT=71046); Future    Dry mouth  -     Cevimeline HCl 30 MG Oral Cap;  Take 1 capsule (30 mg total) by mouth every evening for 7 days, THEN 1 capsul with any questions. This report was performed utilizing speech recognition software technology. Despite proofreading, speech recognition errors could escape detection.  If a word or phrase is confusing or out of context, please do not hesitate to call f

## 2021-11-10 ENCOUNTER — TELEPHONE (OUTPATIENT)
Dept: RHEUMATOLOGY | Facility: CLINIC | Age: 56
End: 2021-11-10

## 2021-11-10 ENCOUNTER — HOSPITAL ENCOUNTER (OUTPATIENT)
Dept: GENERAL RADIOLOGY | Age: 56
Discharge: HOME | End: 2021-11-10
Attending: INTERNAL MEDICINE
Payer: COMMERCIAL

## 2021-11-10 DIAGNOSIS — R05.9 COUGH: ICD-10-CM

## 2021-11-10 DIAGNOSIS — M35.00 SJOGREN'S SYNDROME WITHOUT EXTRAGLANDULAR INVOLVEMENT (HCC): Primary | ICD-10-CM

## 2021-11-10 DIAGNOSIS — Z51.81 THERAPEUTIC DRUG MONITORING: ICD-10-CM

## 2021-11-10 PROCEDURE — 71046 X-RAY EXAM CHEST 2 VIEWS: CPT | Performed by: INTERNAL MEDICINE

## 2021-11-10 RX ORDER — FOLIC ACID 1 MG/1
1 TABLET ORAL DAILY
Qty: 90 TABLET | Refills: 3 | Status: SHIPPED | OUTPATIENT
Start: 2021-11-10 | End: 2021-12-13

## 2021-11-10 RX ORDER — METHOTREXATE 2.5 MG/1
TABLET ORAL
Qty: 38 TABLET | Refills: 0 | Status: SHIPPED | OUTPATIENT
Start: 2021-11-10 | End: 2021-12-22

## 2021-11-10 NOTE — TELEPHONE ENCOUNTER
Patient wore face shield at work today. Dry mouth and cough more much better. Patient is asking for a letter to wear a face shield instead of a mask to help with chronic xerostomia/cough.   I think this is a reasonable idea if his school/employer     Re

## 2021-11-11 ENCOUNTER — TELEPHONE (OUTPATIENT)
Dept: RHEUMATOLOGY | Facility: CLINIC | Age: 56
End: 2021-11-11

## 2021-11-11 NOTE — TELEPHONE ENCOUNTER
Called to verify RX received 6-21-21? Spoke to Mark Tan who states RX was received, filled and never picked up. Returned to stock after 12 days. Confirmed RX sent 11-9-21 was on B/O but will be in between 2-3pm today.   Pt updated and verbalized apprec

## 2021-11-11 NOTE — PROGRESS NOTES
Chest x-ray was normal. If your cough continues and persists until the end of November, message me back and I will order a CT scan to evaluate your lungs. If it resolves with wearing face shield, then the cough may be related to your mask.  It may be worthw

## 2021-12-05 DIAGNOSIS — F41.8 ANXIETY ABOUT HEALTH: ICD-10-CM

## 2021-12-07 RX ORDER — ESCITALOPRAM OXALATE 20 MG/1
TABLET ORAL
Qty: 90 TABLET | Refills: 0 | Status: SHIPPED | OUTPATIENT
Start: 2021-12-07

## 2021-12-13 ENCOUNTER — TELEPHONE (OUTPATIENT)
Dept: RHEUMATOLOGY | Facility: CLINIC | Age: 56
End: 2021-12-13

## 2021-12-13 DIAGNOSIS — R05.9 COUGH: Primary | ICD-10-CM

## 2021-12-13 DIAGNOSIS — M35.00 SJOGREN'S SYNDROME WITHOUT EXTRAGLANDULAR INVOLVEMENT (HCC): ICD-10-CM

## 2021-12-13 DIAGNOSIS — M19.90 INFLAMMATORY ARTHRITIS: ICD-10-CM

## 2021-12-13 RX ORDER — BENZONATATE 100 MG/1
100 CAPSULE ORAL 3 TIMES DAILY PRN
Qty: 90 CAPSULE | Refills: 2 | Status: SHIPPED | OUTPATIENT
Start: 2021-12-13

## 2021-12-13 RX ORDER — LEUCOVORIN CALCIUM 5 MG/1
TABLET ORAL
Qty: 12 TABLET | Refills: 0 | Status: SHIPPED | OUTPATIENT
Start: 2021-12-13 | End: 2022-01-13

## 2021-12-14 NOTE — TELEPHONE ENCOUNTER
----- Message from Benton Mcardle, RN sent at 12/13/2021  3:26 PM CST -----  Regarding: FW: Medicine    ----- Message -----  From: Rodney Wright  Sent: 12/13/2021   2:45 PM CST  To: [facility]  Subject: Medicine

## 2021-12-14 NOTE — TELEPHONE ENCOUNTER
Folic acid was giving the patient gas. methotrexate was giving gas. Split the dose methotrexate. Change folic acid for folinic acid/leucovorin. To once a week    Dry cough is better. Try Tessalon Perles. Cevimeline TID is helping dry eye.    Gricel Espana

## 2021-12-21 ENCOUNTER — TELEPHONE (OUTPATIENT)
Dept: RHEUMATOLOGY | Facility: CLINIC | Age: 56
End: 2021-12-21

## 2021-12-21 DIAGNOSIS — R05.9 COUGH: Primary | ICD-10-CM

## 2021-12-21 NOTE — TELEPHONE ENCOUNTER
----- Message from Hilda Mcmahon, [address] [place] [address] [facility] sent at 12/21/2021  7:52 AM CST -----  Regarding: FW: Coughing    ----- Message -----  From: Eusebio Sumner  Sent: 12/21/2021   7:07 AM CST  To: [facility] Clinical Staff  Subject: Coughing

## 2021-12-21 NOTE — TELEPHONE ENCOUNTER
Phoned pt, pt still having co dry cough, has not seen any improvement from medications. Worse after lying down, denies SOB. Swelling to bilateral hands. Dry mouth and Gerd improvement noted.

## 2021-12-21 NOTE — TELEPHONE ENCOUNTER
Please call the patient and get a status update on the cough. Better, worse stable? What medications have helped? Relationship to dry mouth and GERD? May need to get a HRCT to evaluate inflammation of lungs.

## 2021-12-23 NOTE — TELEPHONE ENCOUNTER
phoned pt, Dr. Bullock Or requests we contact him some imrpovement in dry mouth, refer to ENT (Dr. Astrid Gracia) for f/u for cough. Pt has also reached out to PCP, but voiced understanding of Dr. Stone Franks referral to ENT.

## 2021-12-30 RX ORDER — NEBIVOLOL 2.5 MG/1
TABLET ORAL
Qty: 90 TABLET | Refills: 0 | Status: SHIPPED | OUTPATIENT
Start: 2021-12-30

## 2022-01-13 ENCOUNTER — OFFICE VISIT (OUTPATIENT)
Dept: RHEUMATOLOGY | Facility: CLINIC | Age: 57
End: 2022-01-13
Payer: COMMERCIAL

## 2022-01-13 VITALS
BODY MASS INDEX: 25.03 KG/M2 | HEART RATE: 84 BPM | WEIGHT: 169 LBS | DIASTOLIC BLOOD PRESSURE: 70 MMHG | SYSTOLIC BLOOD PRESSURE: 115 MMHG | OXYGEN SATURATION: 98 % | HEIGHT: 69 IN

## 2022-01-13 DIAGNOSIS — Z51.81 THERAPEUTIC DRUG MONITORING: ICD-10-CM

## 2022-01-13 DIAGNOSIS — M19.90 INFLAMMATORY ARTHRITIS: ICD-10-CM

## 2022-01-13 DIAGNOSIS — K11.7 XEROSTOMIA: ICD-10-CM

## 2022-01-13 DIAGNOSIS — M35.00 SJOGREN'S SYNDROME WITHOUT EXTRAGLANDULAR INVOLVEMENT (HCC): ICD-10-CM

## 2022-01-13 DIAGNOSIS — M05.9 SEROPOSITIVE RHEUMATOID ARTHRITIS (HCC): Primary | ICD-10-CM

## 2022-01-13 PROCEDURE — 99215 OFFICE O/P EST HI 40 MIN: CPT | Performed by: INTERNAL MEDICINE

## 2022-01-13 PROCEDURE — 3074F SYST BP LT 130 MM HG: CPT | Performed by: INTERNAL MEDICINE

## 2022-01-13 PROCEDURE — 3008F BODY MASS INDEX DOCD: CPT | Performed by: INTERNAL MEDICINE

## 2022-01-13 PROCEDURE — 3078F DIAST BP <80 MM HG: CPT | Performed by: INTERNAL MEDICINE

## 2022-01-13 RX ORDER — METHOTREXATE 25 MG/.4ML
25 INJECTION, SOLUTION SUBCUTANEOUS WEEKLY
Qty: 6 EACH | Refills: 0 | Status: SHIPPED | OUTPATIENT
Start: 2022-01-13 | End: 2022-02-17

## 2022-01-13 RX ORDER — MELOXICAM 15 MG/1
15 TABLET ORAL DAILY
Qty: 90 TABLET | Refills: 0 | Status: SHIPPED | OUTPATIENT
Start: 2022-01-13

## 2022-01-13 RX ORDER — LEUCOVORIN CALCIUM 5 MG/1
TABLET ORAL
Qty: 12 TABLET | Refills: 0 | Status: SHIPPED | OUTPATIENT
Start: 2022-01-13

## 2022-01-13 NOTE — PROGRESS NOTES
PA for Otrexup started with ABD    Nurse visit and fu 3mo visit scheduled. Reminded pt to repeat labs in 4wks after starting treatment. Voiced understanding.

## 2022-01-13 NOTE — PATIENT INSTRUCTIONS
Take meloxicam 15 mg daily with food, don't take ibuprofen, naproxen, voltaren the same day, they are in the same family. We will call you about scheduling a time for picking up samples of Otrexup (methotrexate injectable) 25 mg weekly.   This is a

## 2022-01-13 NOTE — PROGRESS NOTES
Rheumatology f/u Patient Note  =====================================================================================================    Patient presents with: Follow - Up: LOV 11/09/21, has been feeling a little better but not by much.  Stopped the MTX clots.    ==============================================================================================================  Visit: 11/09/21    Fingers are more painful. Hard to get on rings. We are achy at the end of the day. Back pain is off and on.    More Disp: , Rfl:       Modified Medications    Modified Medication Previous Medication    LEUCOVORIN 5 MG ORAL TAB leucovorin 5 MG Oral Tab       Take once a week the day after methotrexate. Take once a week the day after methotrexate.      Medications Disco Prolonged expiratory phase  Extremities: No cyanosis, edema or deformities. Neurologic: Strength, CN2-12 grossly intact   Psych: normal affect. Skin: No lesions or rashes. MSK: 28 joint count performed.  No evidence of synovitis in mcp, pip, dip, wrist ANTIDSDNA 8 06/10/2014    SMUD <100 12/01/2015    ANTISM 35 06/10/2014    RNP <100 12/01/2015     Lab Results   Component Value Date    SCL70 <100 12/01/2015    QDUTKEG47 6 06/10/2014     Lab Results   Component Value Date    C3 126.0 10/12/2021    C4 20.4 much better and cough is improved. Patient unable to tolerate PO methotrexate given gastrointestinal side effects including nausea, bloating. Overlap seropositive (+RF) RA. Today, CDAI is 30, indicating severe disease activity.      Plan:  --- for dry contact me with any questions. This report was performed utilizing speech recognition software technology. Despite proofreading, speech recognition errors could escape detection.  If a word or phrase is confusing or out of context, please do not hesitat

## 2022-01-16 NOTE — PROGRESS NOTES
THE [facility] Hematology Oncology Group Progress Note      Patient Name: Mony Zafar   YOB: 1965  Medical Record Number: [id number]  Attending Physician: Edwin Kohli M.D.      Date of Visit: 1/17/2022      Chief Complaint  Extranodal kirill leucovorin 5 MG Oral Tab, Take once a week the day after methotrexate. , Disp: 12 tablet, Rfl: 0  Meloxicam 15 MG Oral Tab, Take 1 tablet (15 mg total) by mouth daily. , Disp: 90 tablet, Rfl: 0  Methotrexate, PF, (OTREXUP) 25 MG/0.4ML Subcutaneous Solution appropriate. Laboratory   No results found for this or any previous visit (from the past 24 hour(s)). Impression and Plan   1. MALT lymphoma: There is no clinical evidence of definitive relapsed disease. Continue active surveillance.      Planned F

## 2022-01-17 ENCOUNTER — OFFICE VISIT (OUTPATIENT)
Dept: HEMATOLOGY/ONCOLOGY | Facility: HOSPITAL | Age: 57
End: 2022-01-17
Attending: SPECIALIST
Payer: COMMERCIAL

## 2022-01-17 VITALS
SYSTOLIC BLOOD PRESSURE: 109 MMHG | WEIGHT: 167.63 LBS | HEART RATE: 90 BPM | HEIGHT: 69.02 IN | TEMPERATURE: 98 F | BODY MASS INDEX: 24.83 KG/M2 | RESPIRATION RATE: 16 BRPM | DIASTOLIC BLOOD PRESSURE: 74 MMHG | OXYGEN SATURATION: 98 %

## 2022-01-17 DIAGNOSIS — C88.4 EXTRANODAL MARGINAL ZONE B-CELL LYMPHOMA OF MUCOSA-ASSOCIATED LYMPHOID TISSUE (MALT) (HCC): Primary | ICD-10-CM

## 2022-01-17 PROCEDURE — 99212 OFFICE O/P EST SF 10 MIN: CPT | Performed by: SPECIALIST

## 2022-01-26 ENCOUNTER — TELEPHONE (OUTPATIENT)
Dept: RHEUMATOLOGY | Facility: CLINIC | Age: 57
End: 2022-01-26

## 2022-01-26 RX ORDER — METHOTREXATE 25 MG/ML
25 INJECTION, SOLUTION SUBCUTANEOUS WEEKLY
Qty: 4 ML | Refills: 3 | Status: SHIPPED | OUTPATIENT
Start: 2022-01-26 | End: 2022-02-25

## 2022-01-26 NOTE — TELEPHONE ENCOUNTER
[facility] called states the followinmg/ml Reditrex  Retractable needle  Inject 45 degree angle to thigh/abd  PreFilled syringe  Store at room temp  Must go to [facility]

## 2022-01-26 NOTE — TELEPHONE ENCOUNTER
Returned call to ABD; Reditrex is preferred, would need to send to [facility]. RN requested dosing guidelines from [facility]; Frances left message for pharmacist to cb. Main dept # given.

## 2022-01-26 NOTE — TELEPHONE ENCOUNTER
Ok for reditrex 25 mg weekly. Continue otrexup until this is approved. He can come in for updated teaching if needed.

## 2022-02-08 ENCOUNTER — TELEPHONE (OUTPATIENT)
Dept: RHEUMATOLOGY | Facility: CLINIC | Age: 57
End: 2022-02-08

## 2022-02-08 RX ORDER — METHOTREXATE 25 MG/ML
25 INJECTION, SOLUTION SUBCUTANEOUS WEEKLY
Qty: 4 ML | Refills: 2 | Status: SHIPPED | OUTPATIENT
Start: 2022-02-08

## 2022-02-08 NOTE — TELEPHONE ENCOUNTER
Called pt, recommend subcutaneous methotrexate Reditrex over orencia. And this is safer given hx of lymphoma. Patient agreed since it is already approved. Please arrange shipment, please reiterate that leucovorin should be taken 1 day after subcutaneous methotrexate. Repeat bloodwork 1 month after starting reditrex.

## 2022-02-08 NOTE — TELEPHONE ENCOUNTER
PA for Reditrex was approved, pt would like to try orencia per his [facility] message. Would require a new PA.  Please advise

## 2022-02-08 NOTE — TELEPHONE ENCOUNTER
----- Message from Alec Solano RN sent at 2/8/2022  7:34 AM CST -----  Regarding: FW: Mask     ----- Message -----  From: Jennyfer Alston  Sent: 2/8/2022   7:01 AM CST  To: [facility] Clinical Staff  Subject: Mask                                             Also will have to try a different medicine for my arthritis. They will not pay for the shots.

## 2022-02-08 NOTE — TELEPHONE ENCOUNTER
Called pt, per Dr. Shalini Hernandez recommend subcutaneous methotrexate Reditrex over orencia. And this is safer given hx of lymphoma. Patient agreed since it is already approved. [facility] [facility] information given to pt to arrange delivery, will call our office once delivery expected to arrange nurse teaching. Reiterated to pt that leucovorin should be taken 1 day after subcutaneous methotrexate, and to repeat bloodwork 1 month after starting reditrex. Pt voiced understanding of information given. Denies additional questions or concerns.

## 2022-02-10 NOTE — TELEPHONE ENCOUNTER
Pt needs  CLARITIN-D 24 HOUR  MG Oral Tablet 24 Hr   Pt needs this sent to  [facility] on [address] in [place].

## 2022-02-11 ENCOUNTER — PATIENT MESSAGE (OUTPATIENT)
Dept: FAMILY MEDICINE CLINIC | Facility: CLINIC | Age: 57
End: 2022-02-11

## 2022-02-11 RX ORDER — LORATADINE AND PSEUDOEPHEDRINE SULFATE 10; 240 MG/1; MG/1
1 TABLET, EXTENDED RELEASE ORAL DAILY
Qty: 90 TABLET | Refills: 0 | Status: SHIPPED | OUTPATIENT
Start: 2022-02-11 | End: 2022-02-11 | Stop reason: CLARIF

## 2022-02-11 NOTE — TELEPHONE ENCOUNTER
Patient called, Claritin was sent to an incorrect pharmacy, please send to [facility]. Pt stated the pharmacy is holding the last 3 boxes for him.

## 2022-02-12 NOTE — TELEPHONE ENCOUNTER
From: Jose Kasper  To: Sonja Cain MD  Sent: 2/11/2022 4:29 PM CST  Subject: Prescription refill    I need my Outlook D prescription filled at [facility] on [address] in [facility]. Your nurse sent it to the wrong pharmacy.  Thanks

## 2022-02-12 NOTE — TELEPHONE ENCOUNTER
Doc,    The claritin D has been pended to go to [facility] per pt request.  It appears to have been sent to the wrong pharmacy.   thanks

## 2022-02-14 RX ORDER — LORATADINE AND PSEUDOEPHEDRINE SULFATE 10; 240 MG/1; MG/1
1 TABLET, EXTENDED RELEASE ORAL DAILY
Qty: 30 TABLET | Refills: 1 | Status: SHIPPED | OUTPATIENT
Start: 2022-02-14

## 2022-02-14 RX ORDER — LORATADINE AND PSEUDOEPHEDRINE SULFATE 10; 240 MG/1; MG/1
1 TABLET, EXTENDED RELEASE ORAL DAILY
Qty: 90 TABLET | Refills: 0 | Status: SHIPPED | OUTPATIENT
Start: 2022-02-14

## 2022-02-21 ENCOUNTER — TELEPHONE (OUTPATIENT)
Dept: RHEUMATOLOGY | Facility: CLINIC | Age: 57
End: 2022-02-21

## 2022-02-21 ENCOUNTER — OFFICE VISIT (OUTPATIENT)
Dept: FAMILY MEDICINE CLINIC | Facility: CLINIC | Age: 57
End: 2022-02-21
Payer: COMMERCIAL

## 2022-02-21 VITALS
HEART RATE: 73 BPM | WEIGHT: 164 LBS | RESPIRATION RATE: 16 BRPM | BODY MASS INDEX: 24.29 KG/M2 | DIASTOLIC BLOOD PRESSURE: 80 MMHG | HEIGHT: 69.02 IN | SYSTOLIC BLOOD PRESSURE: 120 MMHG | OXYGEN SATURATION: 99 %

## 2022-02-21 DIAGNOSIS — Z23 NEED FOR PNEUMOCOCCAL VACCINATION: ICD-10-CM

## 2022-02-21 DIAGNOSIS — Z12.11 SCREEN FOR COLON CANCER: ICD-10-CM

## 2022-02-21 DIAGNOSIS — M05.9 RHEUMATOID ARTHRITIS WITH POSITIVE RHEUMATOID FACTOR, INVOLVING UNSPECIFIED SITE (HCC): ICD-10-CM

## 2022-02-21 DIAGNOSIS — I48.0 PAROXYSMAL ATRIAL FIBRILLATION (HCC): Primary | ICD-10-CM

## 2022-02-21 DIAGNOSIS — J30.2 SEASONAL ALLERGIES: ICD-10-CM

## 2022-02-21 DIAGNOSIS — M35.00 SJOGREN'S SYNDROME WITHOUT EXTRAGLANDULAR INVOLVEMENT (HCC): ICD-10-CM

## 2022-02-21 PROBLEM — R68.2 DRY MOUTH: Status: RESOLVED | Noted: 2021-06-21 | Resolved: 2022-02-21

## 2022-02-21 PROCEDURE — 99214 OFFICE O/P EST MOD 30 MIN: CPT | Performed by: FAMILY MEDICINE

## 2022-02-21 PROCEDURE — 3079F DIAST BP 80-89 MM HG: CPT | Performed by: FAMILY MEDICINE

## 2022-02-21 PROCEDURE — 3074F SYST BP LT 130 MM HG: CPT | Performed by: FAMILY MEDICINE

## 2022-02-21 PROCEDURE — 3008F BODY MASS INDEX DOCD: CPT | Performed by: FAMILY MEDICINE

## 2022-02-21 RX ORDER — MONTELUKAST SODIUM 10 MG/1
10 TABLET ORAL DAILY
Qty: 30 TABLET | Refills: 3 | Status: SHIPPED | OUTPATIENT
Start: 2022-02-21 | End: 2022-03-23

## 2022-02-21 NOTE — TELEPHONE ENCOUNTER
Pt phoned office, requesting his refill of reditrex. [facility] number given. Pt will call with questions or concerns.

## 2022-02-24 PROBLEM — M05.9 RHEUMATOID ARTHRITIS WITH POSITIVE RHEUMATOID FACTOR (HCC): Status: ACTIVE | Noted: 2022-02-24

## 2022-03-02 ENCOUNTER — PATIENT MESSAGE (OUTPATIENT)
Dept: RHEUMATOLOGY | Facility: CLINIC | Age: 57
End: 2022-03-02

## 2022-03-06 RX ORDER — LEFLUNOMIDE 20 MG/1
TABLET ORAL
Qty: 38 TABLET | Refills: 0 | Status: SHIPPED | OUTPATIENT
Start: 2022-03-06 | End: 2022-04-20

## 2022-03-22 RX ORDER — ESCITALOPRAM OXALATE 20 MG/1
TABLET ORAL
Qty: 90 TABLET | Refills: 1 | Status: SHIPPED | OUTPATIENT
Start: 2022-03-22

## 2022-04-04 ENCOUNTER — TELEPHONE (OUTPATIENT)
Dept: FAMILY MEDICINE CLINIC | Facility: CLINIC | Age: 57
End: 2022-04-04

## 2022-04-04 RX ORDER — NEBIVOLOL 2.5 MG/1
2.5 TABLET ORAL DAILY
Qty: 90 TABLET | Refills: 0 | Status: SHIPPED | OUTPATIENT
Start: 2022-04-04

## 2022-04-04 NOTE — TELEPHONE ENCOUNTER
Pt requesting bistolic to go to [facility]. Pt states his insurance will only pay for 90 day. Pt requesting asap as he is all out.

## 2022-04-22 ENCOUNTER — TELEPHONE (OUTPATIENT)
Dept: RHEUMATOLOGY | Facility: CLINIC | Age: 57
End: 2022-04-22

## 2022-04-22 DIAGNOSIS — M35.00 SJOGREN'S SYNDROME WITHOUT EXTRAGLANDULAR INVOLVEMENT (HCC): ICD-10-CM

## 2022-04-22 DIAGNOSIS — M05.9 RHEUMATOID ARTHRITIS WITH POSITIVE RHEUMATOID FACTOR, INVOLVING UNSPECIFIED SITE (HCC): ICD-10-CM

## 2022-04-22 RX ORDER — LEFLUNOMIDE 20 MG/1
TABLET ORAL
Qty: 38 TABLET | Refills: 0 | OUTPATIENT
Start: 2022-04-22

## 2022-04-22 NOTE — TELEPHONE ENCOUNTER
Future Appointments   Date Time Provider Josh Rocío   4/26/2022 11:30 AM Sulema Anguiano MD [facility]   6/9/2022  9:30 AM Sulema Anguiano MD [facility] [address]     LOV 1/13/22  RTO in 3mo

## 2022-04-22 NOTE — TELEPHONE ENCOUNTER
Let pt know he needs repeat CMP and CBC w/ diff prior to refill. Standing orders are in.  He can get this in [place]

## 2022-04-25 NOTE — TELEPHONE ENCOUNTER
Called pt, notified he will need to repeat CMP and CBC w/ diff prior to refill. Standing orders are in. He can get this in [place]. Pt voiced understanding.

## 2022-04-26 ENCOUNTER — TELEPHONE (OUTPATIENT)
Dept: FAMILY MEDICINE CLINIC | Facility: CLINIC | Age: 57
End: 2022-04-26

## 2022-04-26 ENCOUNTER — OFFICE VISIT (OUTPATIENT)
Dept: FAMILY MEDICINE CLINIC | Facility: CLINIC | Age: 57
End: 2022-04-26
Payer: COMMERCIAL

## 2022-04-26 ENCOUNTER — TELEPHONE (OUTPATIENT)
Dept: RHEUMATOLOGY | Facility: CLINIC | Age: 57
End: 2022-04-26

## 2022-04-26 ENCOUNTER — LAB ENCOUNTER (OUTPATIENT)
Dept: LAB | Age: 57
End: 2022-04-26
Attending: INTERNAL MEDICINE
Payer: COMMERCIAL

## 2022-04-26 VITALS
WEIGHT: 164 LBS | RESPIRATION RATE: 16 BRPM | SYSTOLIC BLOOD PRESSURE: 118 MMHG | OXYGEN SATURATION: 99 % | HEIGHT: 69.02 IN | HEART RATE: 81 BPM | DIASTOLIC BLOOD PRESSURE: 80 MMHG | BODY MASS INDEX: 24.29 KG/M2

## 2022-04-26 DIAGNOSIS — R05.9 COUGH: ICD-10-CM

## 2022-04-26 DIAGNOSIS — Z51.81 THERAPEUTIC DRUG MONITORING: ICD-10-CM

## 2022-04-26 DIAGNOSIS — R05.3 PERSISTENT COUGH FOR 3 WEEKS OR LONGER: Primary | ICD-10-CM

## 2022-04-26 DIAGNOSIS — M35.00 SJOGREN'S SYNDROME WITHOUT EXTRAGLANDULAR INVOLVEMENT (HCC): ICD-10-CM

## 2022-04-26 DIAGNOSIS — Z12.11 SCREENING FOR COLON CANCER: ICD-10-CM

## 2022-04-26 LAB
ALBUMIN SERPL-MCNC: 3.8 G/DL (ref 3.4–5)
ALBUMIN/GLOB SERPL: 1 {RATIO} (ref 1–2)
ALP LIVER SERPL-CCNC: 119 U/L
ALT SERPL-CCNC: 47 U/L
ANION GAP SERPL CALC-SCNC: 2 MMOL/L (ref 0–18)
AST SERPL-CCNC: 36 U/L (ref 15–37)
BASOPHILS # BLD AUTO: 0.03 X10(3) UL (ref 0–0.2)
BASOPHILS NFR BLD AUTO: 0.6 %
BILIRUB SERPL-MCNC: 0.8 MG/DL (ref 0.1–2)
BUN BLD-MCNC: 13 MG/DL (ref 7–18)
CALCIUM BLD-MCNC: 8.6 MG/DL (ref 8.5–10.1)
CHLORIDE SERPL-SCNC: 107 MMOL/L (ref 98–112)
CO2 SERPL-SCNC: 30 MMOL/L (ref 21–32)
CREAT BLD-MCNC: 1.08 MG/DL
EOSINOPHIL # BLD AUTO: 0.45 X10(3) UL (ref 0–0.7)
EOSINOPHIL NFR BLD AUTO: 9.4 %
ERYTHROCYTE [DISTWIDTH] IN BLOOD BY AUTOMATED COUNT: 11.4 %
FASTING STATUS PATIENT QL REPORTED: YES
GLOBULIN PLAS-MCNC: 3.9 G/DL (ref 2.8–4.4)
GLUCOSE BLD-MCNC: 103 MG/DL (ref 70–99)
HCT VFR BLD AUTO: 46.8 %
HGB BLD-MCNC: 15.7 G/DL
IMM GRANULOCYTES # BLD AUTO: 0.01 X10(3) UL (ref 0–1)
IMM GRANULOCYTES NFR BLD: 0.2 %
LYMPHOCYTES # BLD AUTO: 0.91 X10(3) UL (ref 1–4)
LYMPHOCYTES NFR BLD AUTO: 19.1 %
MCH RBC QN AUTO: 30.6 PG (ref 26–34)
MCHC RBC AUTO-ENTMCNC: 33.5 G/DL (ref 31–37)
MCV RBC AUTO: 91.2 FL
MONOCYTES # BLD AUTO: 0.73 X10(3) UL (ref 0.1–1)
MONOCYTES NFR BLD AUTO: 15.3 %
NEUTROPHILS # BLD AUTO: 2.64 X10 (3) UL (ref 1.5–7.7)
NEUTROPHILS # BLD AUTO: 2.64 X10(3) UL (ref 1.5–7.7)
NEUTROPHILS NFR BLD AUTO: 55.4 %
OSMOLALITY SERPL CALC.SUM OF ELEC: 288 MOSM/KG (ref 275–295)
PLATELET # BLD AUTO: 214 10(3)UL (ref 150–450)
POTASSIUM SERPL-SCNC: 4.1 MMOL/L (ref 3.5–5.1)
PROT SERPL-MCNC: 7.7 G/DL (ref 6.4–8.2)
RBC # BLD AUTO: 5.13 X10(6)UL
SODIUM SERPL-SCNC: 139 MMOL/L (ref 136–145)
WBC # BLD AUTO: 4.8 X10(3) UL (ref 4–11)

## 2022-04-26 PROCEDURE — 99214 OFFICE O/P EST MOD 30 MIN: CPT | Performed by: FAMILY MEDICINE

## 2022-04-26 PROCEDURE — 3074F SYST BP LT 130 MM HG: CPT | Performed by: FAMILY MEDICINE

## 2022-04-26 PROCEDURE — 3008F BODY MASS INDEX DOCD: CPT | Performed by: FAMILY MEDICINE

## 2022-04-26 PROCEDURE — 36415 COLL VENOUS BLD VENIPUNCTURE: CPT

## 2022-04-26 PROCEDURE — 80053 COMPREHEN METABOLIC PANEL: CPT

## 2022-04-26 PROCEDURE — 85025 COMPLETE CBC W/AUTO DIFF WBC: CPT

## 2022-04-26 PROCEDURE — 3079F DIAST BP 80-89 MM HG: CPT | Performed by: FAMILY MEDICINE

## 2022-04-26 RX ORDER — FEXOFENADINE HCL AND PSEUDOEPHEDRINE HCI 180; 240 MG/1; MG/1
1 TABLET, EXTENDED RELEASE ORAL DAILY
Qty: 90 TABLET | Refills: 0 | Status: SHIPPED
Start: 2022-04-26 | End: 2022-07-25

## 2022-04-26 RX ORDER — BENZONATATE 100 MG/1
100 CAPSULE ORAL 3 TIMES DAILY PRN
Qty: 90 CAPSULE | Refills: 0 | Status: SHIPPED | OUTPATIENT
Start: 2022-04-26

## 2022-04-26 RX ORDER — FEXOFENADINE HCL AND PSEUDOEPHEDRINE HCI 180; 240 MG/1; MG/1
1 TABLET, EXTENDED RELEASE ORAL DAILY PRN
Qty: 30 TABLET | Refills: 3 | Status: SHIPPED
Start: 2022-04-26 | End: 2022-05-26

## 2022-04-26 RX ORDER — FEXOFENADINE HCL AND PSEUDOEPHEDRINE HCI 180; 240 MG/1; MG/1
1 TABLET, EXTENDED RELEASE ORAL DAILY PRN
Qty: 30 TABLET | Refills: 3 | Status: SHIPPED
Start: 2022-04-26 | End: 2022-04-26 | Stop reason: SDUPTHER

## 2022-04-26 RX ORDER — LEFLUNOMIDE 20 MG/1
20 TABLET ORAL DAILY
Qty: 45 TABLET | Refills: 0 | Status: SHIPPED | OUTPATIENT
Start: 2022-04-26

## 2022-04-26 NOTE — PROGRESS NOTES
Blood count, liver tests, kidney function are stable. I refilled leflunomide. Get repeat blood work (metabolic panel and blood count) in early June prior to our next visit for monitoring, if that looks good, then we can space things out to every 3 months.

## 2022-04-26 NOTE — TELEPHONE ENCOUNTER
Pt is currently at pharmacy trying to p/u Fexofenadine-Pseudoephed ER. Transmission to pharmacy failed, pt asking it be resent asap since he is there now.

## 2022-05-05 ENCOUNTER — TELEPHONE (OUTPATIENT)
Dept: RHEUMATOLOGY | Facility: CLINIC | Age: 57
End: 2022-05-05

## 2022-05-26 ENCOUNTER — OFFICE VISIT (OUTPATIENT)
Dept: FAMILY MEDICINE CLINIC | Facility: CLINIC | Age: 57
End: 2022-05-26
Payer: COMMERCIAL

## 2022-05-26 VITALS
OXYGEN SATURATION: 98 % | DIASTOLIC BLOOD PRESSURE: 76 MMHG | RESPIRATION RATE: 18 BRPM | HEART RATE: 80 BPM | BODY MASS INDEX: 24 KG/M2 | SYSTOLIC BLOOD PRESSURE: 118 MMHG | TEMPERATURE: 98 F | WEIGHT: 162 LBS

## 2022-05-26 DIAGNOSIS — L30.9 DERMATITIS: Primary | ICD-10-CM

## 2022-05-26 PROCEDURE — 3078F DIAST BP <80 MM HG: CPT | Performed by: NURSE PRACTITIONER

## 2022-05-26 PROCEDURE — 3074F SYST BP LT 130 MM HG: CPT | Performed by: NURSE PRACTITIONER

## 2022-05-26 PROCEDURE — 99213 OFFICE O/P EST LOW 20 MIN: CPT | Performed by: NURSE PRACTITIONER

## 2022-06-07 DIAGNOSIS — M05.9 RHEUMATOID ARTHRITIS WITH POSITIVE RHEUMATOID FACTOR, INVOLVING UNSPECIFIED SITE (HCC): ICD-10-CM

## 2022-06-07 DIAGNOSIS — M35.00 SJOGREN'S SYNDROME WITHOUT EXTRAGLANDULAR INVOLVEMENT (HCC): ICD-10-CM

## 2022-06-08 RX ORDER — LEFLUNOMIDE 20 MG/1
TABLET ORAL
Qty: 45 TABLET | Refills: 0 | OUTPATIENT
Start: 2022-06-08

## 2022-07-05 RX ORDER — NEBIVOLOL 2.5 MG/1
TABLET ORAL
Qty: 90 TABLET | Refills: 0 | Status: SHIPPED | OUTPATIENT
Start: 2022-07-05

## 2022-08-15 ENCOUNTER — OFFICE VISIT (OUTPATIENT)
Dept: FAMILY MEDICINE CLINIC | Facility: CLINIC | Age: 57
End: 2022-08-15
Payer: COMMERCIAL

## 2022-08-15 VITALS
RESPIRATION RATE: 18 BRPM | SYSTOLIC BLOOD PRESSURE: 128 MMHG | DIASTOLIC BLOOD PRESSURE: 68 MMHG | OXYGEN SATURATION: 99 % | TEMPERATURE: 98 F | HEART RATE: 79 BPM

## 2022-08-15 DIAGNOSIS — H61.22 IMPACTED CERUMEN, LEFT EAR: Primary | ICD-10-CM

## 2022-08-15 DIAGNOSIS — M26.622 TMJ TENDERNESS, LEFT: ICD-10-CM

## 2022-08-15 PROCEDURE — 3074F SYST BP LT 130 MM HG: CPT | Performed by: NURSE PRACTITIONER

## 2022-08-15 PROCEDURE — 99213 OFFICE O/P EST LOW 20 MIN: CPT | Performed by: NURSE PRACTITIONER

## 2022-08-15 PROCEDURE — 3078F DIAST BP <80 MM HG: CPT | Performed by: NURSE PRACTITIONER

## 2022-09-17 DIAGNOSIS — F41.8 ANXIETY ABOUT HEALTH: ICD-10-CM

## 2022-09-19 RX ORDER — ESCITALOPRAM OXALATE 20 MG/1
TABLET ORAL
Qty: 90 TABLET | Refills: 0 | Status: SHIPPED | OUTPATIENT
Start: 2022-09-19

## 2022-10-03 RX ORDER — NEBIVOLOL 2.5 MG/1
2.5 TABLET ORAL DAILY
Qty: 30 TABLET | Refills: 0 | Status: SHIPPED | OUTPATIENT
Start: 2022-10-03

## 2022-10-03 NOTE — TELEPHONE ENCOUNTER
Requesting refill on nebivolol. LOV 4/26/2022. No appointment scheduled at this time. LF 7/5/2022. Rx refilled for 30 days. Due for OV. PSR: Please assist patient in scheduling OV. Thank you.

## 2022-10-18 ENCOUNTER — TELEPHONE (OUTPATIENT)
Dept: FAMILY MEDICINE CLINIC | Facility: CLINIC | Age: 57
End: 2022-10-18

## 2022-10-18 RX ORDER — NEBIVOLOL 2.5 MG/1
2.5 TABLET ORAL DAILY
Qty: 90 TABLET | Refills: 0 | Status: SHIPPED | OUTPATIENT
Start: 2022-10-18

## 2022-10-18 NOTE — TELEPHONE ENCOUNTER
Pt says [employer] wont cover 30 days, and [facility] wont fill 90 days. Can he get a 90 day sent to [facility] on [address]?      Had to reschedule in person visit due to being covid positive

## 2022-11-01 ENCOUNTER — OFFICE VISIT (OUTPATIENT)
Dept: FAMILY MEDICINE CLINIC | Facility: CLINIC | Age: 57
End: 2022-11-01
Payer: COMMERCIAL

## 2022-11-01 VITALS
DIASTOLIC BLOOD PRESSURE: 82 MMHG | WEIGHT: 160 LBS | HEIGHT: 69.02 IN | OXYGEN SATURATION: 98 % | BODY MASS INDEX: 23.7 KG/M2 | HEART RATE: 89 BPM | SYSTOLIC BLOOD PRESSURE: 120 MMHG | RESPIRATION RATE: 16 BRPM

## 2022-11-01 DIAGNOSIS — M35.00 SJOGREN'S SYNDROME WITHOUT EXTRAGLANDULAR INVOLVEMENT (HCC): ICD-10-CM

## 2022-11-01 DIAGNOSIS — R05.3 PERSISTENT COUGH FOR 3 WEEKS OR LONGER: ICD-10-CM

## 2022-11-01 DIAGNOSIS — M05.9 RHEUMATOID ARTHRITIS WITH POSITIVE RHEUMATOID FACTOR, INVOLVING UNSPECIFIED SITE (HCC): ICD-10-CM

## 2022-11-01 DIAGNOSIS — Z00.00 ANNUAL PHYSICAL EXAM: Primary | ICD-10-CM

## 2022-11-01 DIAGNOSIS — Z12.5 SCREENING FOR PROSTATE CANCER: ICD-10-CM

## 2022-11-01 DIAGNOSIS — R76.8 POSITIVE ANA (ANTINUCLEAR ANTIBODY): ICD-10-CM

## 2022-11-01 DIAGNOSIS — I48.0 PAROXYSMAL ATRIAL FIBRILLATION (HCC): ICD-10-CM

## 2022-11-01 RX ORDER — HYDROCODONE POLISTIREX AND CHLORPHENIRAMINE POLISTIREX 10; 8 MG/5ML; MG/5ML
5 SUSPENSION, EXTENDED RELEASE ORAL 2 TIMES DAILY PRN
Qty: 150 ML | Refills: 0 | Status: SHIPPED | OUTPATIENT
Start: 2022-11-01 | End: 2022-11-02

## 2022-11-01 NOTE — TELEPHONE ENCOUNTER
[facility]. Spoke with Sharon Gupta. Sharon Gupta states that she attempted to explain to patient that they do not have enough of the Tussionex in stock, so rx would need to go to other pharmacy; however, patient hung up the phone before Sharon Gupta could tell him where it is in stock. Currently in stock at [facility] on [facility] [address] ([phone]) and [facility] ([phone]). Rx canceled at current pharmacy. Called patient to see which pharmacy that he would like rx to go to. Pended rx to requested pharmacy. Please approve or deny pending rx. Thank you.

## 2022-11-01 NOTE — TELEPHONE ENCOUNTER
Patient stated that [facility] told him that the :  Hydrocod Polst-CPM Polst ER (TUSSIONEX PENNKINETIC ER) 10-8 MG/5ML Oral Suspension Extended Release. They told the patient its a special order and Dr Espinoza Trinh has to order it. .    Please Advise

## 2022-11-02 RX ORDER — HYDROCODONE POLISTIREX AND CHLORPHENIRAMINE POLISTIREX 10; 8 MG/5ML; MG/5ML
5 SUSPENSION, EXTENDED RELEASE ORAL 2 TIMES DAILY PRN
Qty: 150 ML | Refills: 0 | Status: SHIPPED | OUTPATIENT
Start: 2022-11-02

## 2022-12-01 ENCOUNTER — LAB ENCOUNTER (OUTPATIENT)
Dept: LAB | Age: 57
End: 2022-12-01
Attending: FAMILY MEDICINE
Payer: COMMERCIAL

## 2022-12-01 DIAGNOSIS — Z12.5 SCREENING FOR PROSTATE CANCER: ICD-10-CM

## 2022-12-01 DIAGNOSIS — Z00.00 ANNUAL PHYSICAL EXAM: ICD-10-CM

## 2022-12-01 LAB
ALBUMIN SERPL-MCNC: 3.8 G/DL (ref 3.4–5)
ALBUMIN/GLOB SERPL: 1 {RATIO} (ref 1–2)
ALP LIVER SERPL-CCNC: 109 U/L
ALT SERPL-CCNC: 40 U/L
ANION GAP SERPL CALC-SCNC: 0 MMOL/L (ref 0–18)
AST SERPL-CCNC: 31 U/L (ref 15–37)
BASOPHILS # BLD AUTO: 0.02 X10(3) UL (ref 0–0.2)
BASOPHILS NFR BLD AUTO: 0.3 %
BILIRUB SERPL-MCNC: 0.6 MG/DL (ref 0.1–2)
BUN BLD-MCNC: 19 MG/DL (ref 7–18)
CALCIUM BLD-MCNC: 9.2 MG/DL (ref 8.5–10.1)
CHLORIDE SERPL-SCNC: 109 MMOL/L (ref 98–112)
CHOLEST SERPL-MCNC: 160 MG/DL (ref ?–200)
CO2 SERPL-SCNC: 30 MMOL/L (ref 21–32)
COMPLEXED PSA SERPL-MCNC: 1.53 NG/ML (ref ?–4)
CREAT BLD-MCNC: 1.12 MG/DL
EOSINOPHIL # BLD AUTO: 0.41 X10(3) UL (ref 0–0.7)
EOSINOPHIL NFR BLD AUTO: 6.8 %
ERYTHROCYTE [DISTWIDTH] IN BLOOD BY AUTOMATED COUNT: 11.6 %
FASTING PATIENT LIPID ANSWER: YES
FASTING STATUS PATIENT QL REPORTED: YES
GFR SERPLBLD BASED ON 1.73 SQ M-ARVRAT: 77 ML/MIN/1.73M2 (ref 60–?)
GLOBULIN PLAS-MCNC: 3.9 G/DL (ref 2.8–4.4)
GLUCOSE BLD-MCNC: 95 MG/DL (ref 70–99)
HCT VFR BLD AUTO: 44.2 %
HDLC SERPL-MCNC: 30 MG/DL (ref 40–59)
HGB BLD-MCNC: 14.7 G/DL
IMM GRANULOCYTES # BLD AUTO: 0.03 X10(3) UL (ref 0–1)
IMM GRANULOCYTES NFR BLD: 0.5 %
LDLC SERPL CALC-MCNC: 101 MG/DL (ref ?–100)
LYMPHOCYTES # BLD AUTO: 1.23 X10(3) UL (ref 1–4)
LYMPHOCYTES NFR BLD AUTO: 20.5 %
MCH RBC QN AUTO: 31.1 PG (ref 26–34)
MCHC RBC AUTO-ENTMCNC: 33.3 G/DL (ref 31–37)
MCV RBC AUTO: 93.6 FL
MONOCYTES # BLD AUTO: 0.77 X10(3) UL (ref 0.1–1)
MONOCYTES NFR BLD AUTO: 12.8 %
NEUTROPHILS # BLD AUTO: 3.55 X10 (3) UL (ref 1.5–7.7)
NEUTROPHILS # BLD AUTO: 3.55 X10(3) UL (ref 1.5–7.7)
NEUTROPHILS NFR BLD AUTO: 59.1 %
NONHDLC SERPL-MCNC: 130 MG/DL (ref ?–130)
OSMOLALITY SERPL CALC.SUM OF ELEC: 290 MOSM/KG (ref 275–295)
PLATELET # BLD AUTO: 208 10(3)UL (ref 150–450)
POTASSIUM SERPL-SCNC: 4.3 MMOL/L (ref 3.5–5.1)
PROT SERPL-MCNC: 7.7 G/DL (ref 6.4–8.2)
RBC # BLD AUTO: 4.72 X10(6)UL
SODIUM SERPL-SCNC: 139 MMOL/L (ref 136–145)
TRIGL SERPL-MCNC: 161 MG/DL (ref 30–149)
TSI SER-ACNC: 1.83 MIU/ML (ref 0.36–3.74)
VLDLC SERPL CALC-MCNC: 27 MG/DL (ref 0–30)
WBC # BLD AUTO: 6 X10(3) UL (ref 4–11)

## 2022-12-01 PROCEDURE — 85025 COMPLETE CBC W/AUTO DIFF WBC: CPT

## 2022-12-01 PROCEDURE — 84443 ASSAY THYROID STIM HORMONE: CPT

## 2022-12-01 PROCEDURE — 80053 COMPREHEN METABOLIC PANEL: CPT

## 2022-12-01 PROCEDURE — 36415 COLL VENOUS BLD VENIPUNCTURE: CPT

## 2022-12-01 PROCEDURE — 80061 LIPID PANEL: CPT

## 2022-12-28 DIAGNOSIS — R05.3 PERSISTENT COUGH FOR 3 WEEKS OR LONGER: ICD-10-CM

## 2022-12-28 NOTE — TELEPHONE ENCOUNTER
Pt states dry cough more so in the evening and worse on waking to the point of losing his voice. Pt requesting refill of cough syrup. Order pended for approval / denial. Linsey Hall 11/01/22 w/ need for f/u in 3-6mo.

## 2022-12-28 NOTE — TELEPHONE ENCOUNTER
Pt called requesting med refill on Hydrocod Polst-CPM Polst ER (TUSSIONEX PENNKINETIC ER) 10-8 MG/5ML Oral Suspension Extended Release.      LOV: 11/01/2022

## 2022-12-30 RX ORDER — HYDROCODONE POLISTIREX AND CHLORPHENIRAMINE POLISTIREX 10; 8 MG/5ML; MG/5ML
5 SUSPENSION, EXTENDED RELEASE ORAL 2 TIMES DAILY PRN
Qty: 150 ML | Refills: 0 | Status: SHIPPED | OUTPATIENT
Start: 2022-12-30

## 2023-01-14 DIAGNOSIS — F41.8 ANXIETY ABOUT HEALTH: ICD-10-CM

## 2023-01-16 RX ORDER — NEBIVOLOL 2.5 MG/1
TABLET ORAL
Qty: 90 TABLET | Refills: 0 | Status: SHIPPED | OUTPATIENT
Start: 2023-01-16

## 2023-01-16 RX ORDER — ESCITALOPRAM OXALATE 20 MG/1
20 TABLET ORAL DAILY
Qty: 90 TABLET | Refills: 0 | Status: SHIPPED | OUTPATIENT
Start: 2023-01-16

## 2023-04-12 DIAGNOSIS — F41.8 ANXIETY ABOUT HEALTH: ICD-10-CM

## 2023-04-13 RX ORDER — ESCITALOPRAM OXALATE 20 MG/1
TABLET ORAL
Qty: 90 TABLET | Refills: 0 | Status: SHIPPED | OUTPATIENT
Start: 2023-04-13

## 2023-04-27 ENCOUNTER — OFFICE VISIT (OUTPATIENT)
Dept: FAMILY MEDICINE CLINIC | Facility: CLINIC | Age: 58
End: 2023-04-27
Payer: COMMERCIAL

## 2023-04-27 VITALS
RESPIRATION RATE: 16 BRPM | OXYGEN SATURATION: 99 % | SYSTOLIC BLOOD PRESSURE: 120 MMHG | HEIGHT: 69.02 IN | DIASTOLIC BLOOD PRESSURE: 80 MMHG | BODY MASS INDEX: 24.59 KG/M2 | HEART RATE: 81 BPM | WEIGHT: 166 LBS

## 2023-04-27 DIAGNOSIS — M35.00 SJOGREN'S SYNDROME WITHOUT EXTRAGLANDULAR INVOLVEMENT (HCC): ICD-10-CM

## 2023-04-27 DIAGNOSIS — Z12.11 SCREENING FOR COLON CANCER: ICD-10-CM

## 2023-04-27 DIAGNOSIS — M54.2 CERVICAL PAIN (NECK): Primary | ICD-10-CM

## 2023-04-27 DIAGNOSIS — M05.9 RHEUMATOID ARTHRITIS WITH POSITIVE RHEUMATOID FACTOR, INVOLVING UNSPECIFIED SITE (HCC): ICD-10-CM

## 2023-04-27 PROCEDURE — 3008F BODY MASS INDEX DOCD: CPT | Performed by: FAMILY MEDICINE

## 2023-04-27 PROCEDURE — 3079F DIAST BP 80-89 MM HG: CPT | Performed by: FAMILY MEDICINE

## 2023-04-27 PROCEDURE — 3074F SYST BP LT 130 MM HG: CPT | Performed by: FAMILY MEDICINE

## 2023-04-27 PROCEDURE — 99214 OFFICE O/P EST MOD 30 MIN: CPT | Performed by: FAMILY MEDICINE

## 2023-04-27 RX ORDER — CYCLOBENZAPRINE HCL 10 MG
10 TABLET ORAL NIGHTLY PRN
Qty: 20 TABLET | Refills: 1 | Status: SHIPPED | OUTPATIENT
Start: 2023-04-27 | End: 2023-05-17

## 2023-04-27 RX ORDER — DICLOFENAC SODIUM 75 MG/1
75 TABLET, DELAYED RELEASE ORAL 2 TIMES DAILY PRN
Qty: 60 TABLET | Refills: 1 | Status: SHIPPED | OUTPATIENT
Start: 2023-04-27

## 2023-04-28 ENCOUNTER — TELEPHONE (OUTPATIENT)
Dept: FAMILY MEDICINE CLINIC | Facility: CLINIC | Age: 58
End: 2023-04-28

## 2023-04-28 ENCOUNTER — HOSPITAL ENCOUNTER (OUTPATIENT)
Dept: GENERAL RADIOLOGY | Facility: HOSPITAL | Age: 58
Discharge: HOME | End: 2023-04-28
Attending: FAMILY MEDICINE
Payer: OTHER MISCELLANEOUS

## 2023-04-28 ENCOUNTER — PATIENT MESSAGE (OUTPATIENT)
Dept: FAMILY MEDICINE CLINIC | Facility: CLINIC | Age: 58
End: 2023-04-28

## 2023-04-28 DIAGNOSIS — M05.9 RHEUMATOID ARTHRITIS WITH POSITIVE RHEUMATOID FACTOR, INVOLVING UNSPECIFIED SITE (HCC): ICD-10-CM

## 2023-04-28 DIAGNOSIS — M54.2 CERVICAL PAIN (NECK): ICD-10-CM

## 2023-04-28 DIAGNOSIS — M35.00 SJOGREN'S SYNDROME WITHOUT EXTRAGLANDULAR INVOLVEMENT (HCC): ICD-10-CM

## 2023-04-28 PROCEDURE — 72050 X-RAY EXAM NECK SPINE 4/5VWS: CPT | Performed by: FAMILY MEDICINE

## 2023-04-28 NOTE — TELEPHONE ENCOUNTER
Noted. Closing encounter. Procedure(s):  ENDOSCOPIC ULTRASOUND (EUS)  ESOPHAGOGASTRODUODENOSCOPY (EGD)  ESOPHAGOGASTRODUODENAL (EGD) BIOPSY. total IV anesthesia    Anesthesia Post Evaluation        Patient location during evaluation: PACU  Note status: Adequate. Level of consciousness: responsive to verbal stimuli and sleepy but conscious  Pain management: satisfactory to patient  Airway patency: patent  Anesthetic complications: no  Cardiovascular status: acceptable  Respiratory status: acceptable  Hydration status: acceptable  Comments: +Post-Anesthesia Evaluation and Assessment    Patient: Angel Guevara MRN: [id number]  SSN: [id number]   YOB: 1950  Age: 72 y.o. Sex: male      Cardiovascular Function/Vital Signs    /64   Pulse 82   Temp 36.7 °C (98 °F)   Resp 20   Ht 6' (1.829 m)   Wt 105.2 kg (232 lb)   SpO2 98%   BMI 31.46 kg/m²     Patient is status post Procedure(s):  ENDOSCOPIC ULTRASOUND (EUS)  ESOPHAGOGASTRODUODENOSCOPY (EGD)  ESOPHAGOGASTRODUODENAL (EGD) BIOPSY. Nausea/Vomiting: Controlled. Postoperative hydration reviewed and adequate. Pain:  Pain Scale 1: Numeric (0 - 10) (02/17/21 1101)  Pain Intensity 1: 0 (02/17/21 1101)   Managed. Neurological Status: At baseline. Mental Status and Level of Consciousness: Arousable. Pulmonary Status:   O2 Device: Nasal cannula (02/17/21 1211)   Adequate oxygenation and airway patent. Complications related to anesthesia: None    Post-anesthesia assessment completed. No concerns. Signed By: Myra Alves MD    2/17/2021  Post anesthesia nausea and vomiting:  controlled      INITIAL Post-op Vital signs: No vitals data found for the desired time range.

## 2023-04-28 NOTE — TELEPHONE ENCOUNTER
Was in the office on 4/27/23 to see the PCP, and is calling this morning to report how he had hurt his neck. The patient forgot to mention that he was lifting a table, and that is what caused his shoulder and neck pain. This injury happened at work. He filled out incident report at work. He is going for his xray today.

## 2023-05-01 RX ORDER — NEBIVOLOL 2.5 MG/1
TABLET ORAL
Qty: 90 TABLET | Refills: 1 | Status: SHIPPED | OUTPATIENT
Start: 2023-05-01

## 2023-05-01 RX ORDER — NEBIVOLOL 2.5 MG/1
TABLET ORAL
Qty: 90 TABLET | Refills: 0 | OUTPATIENT
Start: 2023-05-01

## 2023-06-26 ENCOUNTER — OFFICE VISIT (OUTPATIENT)
Dept: FAMILY MEDICINE CLINIC | Facility: CLINIC | Age: 58
End: 2023-06-26
Payer: COMMERCIAL

## 2023-06-26 VITALS
RESPIRATION RATE: 16 BRPM | SYSTOLIC BLOOD PRESSURE: 120 MMHG | WEIGHT: 165.75 LBS | DIASTOLIC BLOOD PRESSURE: 82 MMHG | OXYGEN SATURATION: 98 % | BODY MASS INDEX: 24.55 KG/M2 | HEIGHT: 69.02 IN | HEART RATE: 86 BPM

## 2023-06-26 DIAGNOSIS — M35.00 SJOGREN'S SYNDROME WITHOUT EXTRAGLANDULAR INVOLVEMENT (HCC): ICD-10-CM

## 2023-06-26 DIAGNOSIS — M54.2 CERVICAL PAIN (NECK): Primary | ICD-10-CM

## 2023-06-26 PROCEDURE — 3074F SYST BP LT 130 MM HG: CPT | Performed by: FAMILY MEDICINE

## 2023-06-26 PROCEDURE — 99214 OFFICE O/P EST MOD 30 MIN: CPT | Performed by: FAMILY MEDICINE

## 2023-06-26 PROCEDURE — 3008F BODY MASS INDEX DOCD: CPT | Performed by: FAMILY MEDICINE

## 2023-06-26 PROCEDURE — 3079F DIAST BP 80-89 MM HG: CPT | Performed by: FAMILY MEDICINE

## 2023-06-27 ENCOUNTER — TELEPHONE (OUTPATIENT)
Dept: FAMILY MEDICINE CLINIC | Facility: CLINIC | Age: 58
End: 2023-06-27

## 2023-06-27 DIAGNOSIS — M54.2 CERVICAL PAIN (NECK): Primary | ICD-10-CM

## 2023-06-27 DIAGNOSIS — M35.00 SJOGREN'S SYNDROME WITHOUT EXTRAGLANDULAR INVOLVEMENT (HCC): ICD-10-CM

## 2023-06-30 DIAGNOSIS — F41.8 ANXIETY ABOUT HEALTH: ICD-10-CM

## 2023-06-30 RX ORDER — ESCITALOPRAM OXALATE 20 MG/1
TABLET ORAL
Qty: 90 TABLET | Refills: 0 | OUTPATIENT
Start: 2023-06-30

## 2023-07-03 ENCOUNTER — PATIENT MESSAGE (OUTPATIENT)
Dept: CASE MANAGEMENT | Age: 58
End: 2023-07-03

## 2023-08-03 DIAGNOSIS — F41.8 ANXIETY ABOUT HEALTH: ICD-10-CM

## 2023-08-03 RX ORDER — ESCITALOPRAM OXALATE 20 MG/1
20 TABLET ORAL DAILY
Qty: 90 TABLET | Refills: 0 | Status: SHIPPED | OUTPATIENT
Start: 2023-08-03

## 2023-08-04 ENCOUNTER — PATIENT MESSAGE (OUTPATIENT)
Dept: CASE MANAGEMENT | Age: 58
End: 2023-08-04

## 2023-08-07 ENCOUNTER — PATIENT MESSAGE (OUTPATIENT)
Dept: CASE MANAGEMENT | Age: 58
End: 2023-08-07

## 2023-08-07 ENCOUNTER — TELEPHONE (OUTPATIENT)
Dept: CASE MANAGEMENT | Age: 58
End: 2023-08-07

## 2023-08-07 DIAGNOSIS — M35.00 SJOGREN'S SYNDROME WITHOUT EXTRAGLANDULAR INVOLVEMENT (HCC): Primary | ICD-10-CM

## 2023-08-07 NOTE — TELEPHONE ENCOUNTER
Angel[employer] sent to notify of ins.  Denial of MRI and referral placed to     Provider Address Phone   Nena Thomas, [address]  [place] [address] [facility] [phone]

## 2023-08-07 NOTE — TELEPHONE ENCOUNTER
MRI Neck        Status: DENIED        Reference number [id number]      A copy of the denial letter is filed under the MEDIA tab, reference for complete details. You may reach out to [address] [place], [place] [address]concepcion Rosenthal at [phone] to discuss decision. Please reach out to patient with plan of care.       Thank you

## 2023-08-29 NOTE — PROGRESS NOTES
Patient here today for follow up with Alana Muniz for B cell/MALT lymphoma.      Outpatient Oncology Care Plan  Problem list:  loss of appetite  fatigue  intermittent feels lump in left neck     Problems related to:    disease     Interventions:  emotional s No

## 2023-09-28 DIAGNOSIS — F41.8 ANXIETY ABOUT HEALTH: ICD-10-CM

## 2023-09-28 RX ORDER — ESCITALOPRAM OXALATE 20 MG/1
20 TABLET ORAL DAILY
Qty: 90 TABLET | Refills: 0 | OUTPATIENT
Start: 2023-09-28

## 2023-11-11 DIAGNOSIS — F41.8 ANXIETY ABOUT HEALTH: ICD-10-CM

## 2023-11-13 RX ORDER — NEBIVOLOL 2.5 MG/1
2.5 TABLET ORAL DAILY
Qty: 90 TABLET | Refills: 1 | Status: SHIPPED | OUTPATIENT
Start: 2023-11-13

## 2023-11-13 RX ORDER — ESCITALOPRAM OXALATE 20 MG/1
20 TABLET ORAL DAILY
Qty: 90 TABLET | Refills: 0 | Status: SHIPPED | OUTPATIENT
Start: 2023-11-13

## 2023-12-13 DIAGNOSIS — R05.3 PERSISTENT COUGH FOR 3 WEEKS OR LONGER: ICD-10-CM

## 2023-12-13 NOTE — TELEPHONE ENCOUNTER
requesting Medication Refill for:  Hydrocod Polst-CPM Polst ER (TUSSIONEX PENNKINETIC ER) 10-8 MG/5ML r/t dry cough due to his Sjrogens disease. LOV 6/26/23. Please advise.

## 2023-12-13 NOTE — TELEPHONE ENCOUNTER
Clifton Flood requesting Medication Refill for:  Hydrocod Polst-CPM Polst ER [facility] ER) 10-8 MG/5ML Oral Suspension Extended Release (Discontinued)  (copy and paste medication information)    LOV: 6/26/2023   Last Refill date: 12/30/22  Pharmacy:   [facility] [address], [place]

## 2023-12-14 RX ORDER — HYDROCODONE POLISTIREX AND CHLORPHENIRAMINE POLISTIREX 10; 8 MG/5ML; MG/5ML
5 SUSPENSION, EXTENDED RELEASE ORAL 2 TIMES DAILY PRN
Qty: 150 ML | Refills: 0 | Status: SHIPPED | OUTPATIENT
Start: 2023-12-14

## 2023-12-27 ENCOUNTER — TELEPHONE (OUTPATIENT)
Dept: RHEUMATOLOGY | Facility: CLINIC | Age: 58
End: 2023-12-27

## 2023-12-27 DIAGNOSIS — M05.9 RHEUMATOID ARTHRITIS WITH POSITIVE RHEUMATOID FACTOR, INVOLVING UNSPECIFIED SITE (HCC): ICD-10-CM

## 2023-12-27 DIAGNOSIS — Z51.81 THERAPEUTIC DRUG MONITORING: ICD-10-CM

## 2023-12-27 DIAGNOSIS — M35.00 SJOGREN'S SYNDROME WITHOUT EXTRAGLANDULAR INVOLVEMENT (HCC): Primary | ICD-10-CM

## 2023-12-29 ENCOUNTER — LAB ENCOUNTER (OUTPATIENT)
Dept: LAB | Age: 58
End: 2023-12-29
Attending: INTERNAL MEDICINE
Payer: COMMERCIAL

## 2023-12-29 DIAGNOSIS — K11.7 XEROSTOMIA: ICD-10-CM

## 2023-12-29 DIAGNOSIS — M05.9 RHEUMATOID ARTHRITIS WITH POSITIVE RHEUMATOID FACTOR, INVOLVING UNSPECIFIED SITE (HCC): ICD-10-CM

## 2023-12-29 DIAGNOSIS — M35.00 SJOGREN'S SYNDROME WITHOUT EXTRAGLANDULAR INVOLVEMENT (HCC): ICD-10-CM

## 2023-12-29 DIAGNOSIS — Z51.81 THERAPEUTIC DRUG MONITORING: ICD-10-CM

## 2023-12-29 LAB
ALBUMIN SERPL-MCNC: 3.7 G/DL (ref 3.4–5)
ALBUMIN/GLOB SERPL: 0.9 {RATIO} (ref 1–2)
ALP LIVER SERPL-CCNC: 98 U/L
ALT SERPL-CCNC: 33 U/L
ANION GAP SERPL CALC-SCNC: 2 MMOL/L (ref 0–18)
AST SERPL-CCNC: 32 U/L (ref 15–37)
BASOPHILS # BLD AUTO: 0.02 X10(3) UL (ref 0–0.2)
BASOPHILS NFR BLD AUTO: 0.4 %
BILIRUB SERPL-MCNC: 0.6 MG/DL (ref 0.1–2)
BUN BLD-MCNC: 12 MG/DL (ref 9–23)
C3 SERPL-MCNC: 115 MG/DL (ref 90–180)
C4 SERPL-MCNC: 20 MG/DL (ref 10–40)
CALCIUM BLD-MCNC: 9.2 MG/DL (ref 8.5–10.1)
CHLORIDE SERPL-SCNC: 106 MMOL/L (ref 98–112)
CO2 SERPL-SCNC: 31 MMOL/L (ref 21–32)
CREAT BLD-MCNC: 1.14 MG/DL
CRP SERPL-MCNC: <0.29 MG/DL (ref ?–0.3)
EGFRCR SERPLBLD CKD-EPI 2021: 75 ML/MIN/1.73M2 (ref 60–?)
EOSINOPHIL # BLD AUTO: 0.34 X10(3) UL (ref 0–0.7)
EOSINOPHIL NFR BLD AUTO: 6.3 %
ERYTHROCYTE [DISTWIDTH] IN BLOOD BY AUTOMATED COUNT: 11.4 %
ERYTHROCYTE [SEDIMENTATION RATE] IN BLOOD: 45 MM/HR
FASTING STATUS PATIENT QL REPORTED: YES
GLOBULIN PLAS-MCNC: 4.3 G/DL (ref 2.8–4.4)
GLUCOSE BLD-MCNC: 103 MG/DL (ref 70–99)
HCT VFR BLD AUTO: 43.9 %
HGB BLD-MCNC: 14.8 G/DL
IMM GRANULOCYTES # BLD AUTO: 0.01 X10(3) UL (ref 0–1)
IMM GRANULOCYTES NFR BLD: 0.2 %
LYMPHOCYTES # BLD AUTO: 1.24 X10(3) UL (ref 1–4)
LYMPHOCYTES NFR BLD AUTO: 23.1 %
MCH RBC QN AUTO: 30.1 PG (ref 26–34)
MCHC RBC AUTO-ENTMCNC: 33.7 G/DL (ref 31–37)
MCV RBC AUTO: 89.4 FL
MONOCYTES # BLD AUTO: 0.55 X10(3) UL (ref 0.1–1)
MONOCYTES NFR BLD AUTO: 10.3 %
NEUTROPHILS # BLD AUTO: 3.2 X10 (3) UL (ref 1.5–7.7)
NEUTROPHILS # BLD AUTO: 3.2 X10(3) UL (ref 1.5–7.7)
NEUTROPHILS NFR BLD AUTO: 59.7 %
OSMOLALITY SERPL CALC.SUM OF ELEC: 288 MOSM/KG (ref 275–295)
PLATELET # BLD AUTO: 254 10(3)UL (ref 150–450)
POTASSIUM SERPL-SCNC: 4.2 MMOL/L (ref 3.5–5.1)
PROT SERPL-MCNC: 8 G/DL (ref 6.4–8.2)
RBC # BLD AUTO: 4.91 X10(6)UL
SODIUM SERPL-SCNC: 139 MMOL/L (ref 136–145)
WBC # BLD AUTO: 5.4 X10(3) UL (ref 4–11)

## 2023-12-29 PROCEDURE — 86140 C-REACTIVE PROTEIN: CPT

## 2023-12-29 PROCEDURE — 85025 COMPLETE CBC W/AUTO DIFF WBC: CPT

## 2023-12-29 PROCEDURE — 84165 PROTEIN E-PHORESIS SERUM: CPT

## 2023-12-29 PROCEDURE — 83521 IG LIGHT CHAINS FREE EACH: CPT

## 2023-12-29 PROCEDURE — 82728 ASSAY OF FERRITIN: CPT

## 2023-12-29 PROCEDURE — 36415 COLL VENOUS BLD VENIPUNCTURE: CPT

## 2023-12-29 PROCEDURE — 86334 IMMUNOFIX E-PHORESIS SERUM: CPT

## 2023-12-29 PROCEDURE — 86160 COMPLEMENT ANTIGEN: CPT

## 2023-12-29 PROCEDURE — 84443 ASSAY THYROID STIM HORMONE: CPT

## 2023-12-29 PROCEDURE — 85652 RBC SED RATE AUTOMATED: CPT

## 2023-12-29 PROCEDURE — 80053 COMPREHEN METABOLIC PANEL: CPT

## 2023-12-29 PROCEDURE — 83550 IRON BINDING TEST: CPT

## 2023-12-29 PROCEDURE — 83540 ASSAY OF IRON: CPT

## 2023-12-29 PROCEDURE — 82550 ASSAY OF CK (CPK): CPT

## 2024-01-02 ENCOUNTER — OFFICE VISIT (OUTPATIENT)
Dept: RHEUMATOLOGY | Facility: CLINIC | Age: 59
End: 2024-01-02
Payer: COMMERCIAL

## 2024-01-02 VITALS
TEMPERATURE: 97 F | SYSTOLIC BLOOD PRESSURE: 92 MMHG | HEIGHT: 69 IN | RESPIRATION RATE: 16 BRPM | DIASTOLIC BLOOD PRESSURE: 62 MMHG | WEIGHT: 160 LBS | HEART RATE: 76 BPM | BODY MASS INDEX: 23.7 KG/M2 | OXYGEN SATURATION: 97 %

## 2024-01-02 DIAGNOSIS — Z51.81 THERAPEUTIC DRUG MONITORING: ICD-10-CM

## 2024-01-02 DIAGNOSIS — M79.10 MYALGIA: ICD-10-CM

## 2024-01-02 DIAGNOSIS — M25.50 ARTHRALGIA, UNSPECIFIED JOINT: ICD-10-CM

## 2024-01-02 DIAGNOSIS — M35.00 SJOGREN'S SYNDROME WITHOUT EXTRAGLANDULAR INVOLVEMENT (HCC): Primary | ICD-10-CM

## 2024-01-02 DIAGNOSIS — K11.7 XEROSTOMIA: ICD-10-CM

## 2024-01-02 DIAGNOSIS — M05.9 RHEUMATOID ARTHRITIS WITH POSITIVE RHEUMATOID FACTOR, INVOLVING UNSPECIFIED SITE (HCC): ICD-10-CM

## 2024-01-02 LAB
BILIRUB UR QL STRIP.AUTO: NEGATIVE
CK SERPL-CCNC: 115 U/L
CLARITY UR REFRACT.AUTO: CLEAR
COLOR UR AUTO: YELLOW
CREAT UR-SCNC: 390 MG/DL
DEPRECATED HBV CORE AB SER IA-ACNC: 62.1 NG/ML
GLUCOSE UR STRIP.AUTO-MCNC: NORMAL MG/DL
HYALINE CASTS #/AREA URNS AUTO: PRESENT /LPF
IRON SATN MFR SERPL: 36 %
IRON SERPL-MCNC: 127 UG/DL
LEUKOCYTE ESTERASE UR QL STRIP.AUTO: NEGATIVE
NITRITE UR QL STRIP.AUTO: NEGATIVE
PH UR STRIP.AUTO: 5.5 [PH] (ref 5–8)
PROT UR STRIP.AUTO-MCNC: 30 MG/DL
PROT UR-MCNC: 24.6 MG/DL
RBC UR QL AUTO: NEGATIVE
SP GR UR STRIP.AUTO: >1.03 (ref 1–1.03)
TIBC SERPL-MCNC: 350 UG/DL (ref 240–450)
TRANSFERRIN SERPL-MCNC: 235 MG/DL (ref 200–360)
TSI SER-ACNC: 1.05 MIU/ML (ref 0.36–3.74)
UROBILINOGEN UR STRIP.AUTO-MCNC: NORMAL MG/DL

## 2024-01-02 PROCEDURE — 81001 URINALYSIS AUTO W/SCOPE: CPT | Performed by: INTERNAL MEDICINE

## 2024-01-02 PROCEDURE — 84156 ASSAY OF PROTEIN URINE: CPT | Performed by: INTERNAL MEDICINE

## 2024-01-02 PROCEDURE — 82570 ASSAY OF URINE CREATININE: CPT | Performed by: INTERNAL MEDICINE

## 2024-01-02 RX ORDER — CEVIMELINE HYDROCHLORIDE 30 MG/1
30 CAPSULE ORAL 3 TIMES DAILY
Qty: 270 CAPSULE | Refills: 1 | Status: SHIPPED | OUTPATIENT
Start: 2024-01-02 | End: 2024-06-30

## 2024-01-02 RX ORDER — HYDROXYCHLOROQUINE SULFATE 200 MG/1
200 TABLET, FILM COATED ORAL 2 TIMES DAILY
Qty: 180 TABLET | Refills: 3 | Status: SHIPPED | OUTPATIENT
Start: 2024-01-02

## 2024-01-02 RX ORDER — METHYLPREDNISOLONE ACETATE 80 MG/ML
80 INJECTION, SUSPENSION INTRA-ARTICULAR; INTRALESIONAL; INTRAMUSCULAR; SOFT TISSUE ONCE
Status: COMPLETED | OUTPATIENT
Start: 2024-01-02 | End: 2024-01-02

## 2024-01-02 RX ADMIN — METHYLPREDNISOLONE ACETATE 80 MG: 80 INJECTION, SUSPENSION INTRA-ARTICULAR; INTRALESIONAL; INTRAMUSCULAR; SOFT TISSUE at 14:45:00

## 2024-01-02 NOTE — PATIENT INSTRUCTIONS
Hydroxychloroquine (plaquenil) start: Always take WITH food. Take 1 tab daily for 2 weeks, and if tolerating, then increase to 1 tab twice daily.    Restart cevimeline 30 mg three times daily for dry mouth.     See Dr. Sanchez again.     Depending on bloodwork, I may send you for additional tests (B12/folic acid, etc).       Sjogren's Plan:      Dry eyes, try OTC eye drops: Systane, Refresh or Blink. Preservative-free (individually packaged) are the best as preservatives can dry the eyes out.     Dry Mouth Remedies: https://www.sjogrens.org/member-community/product-directory/products-for-dry-mouth   Highlights:   Dry Mouth Lozenges/tabs: ACT Dry mouth lozenges, XyliMelts,TheraMints, MIGHTEAFLOW Dry Mouth Chewing Gum/Lozenges  Dry Mouth Spray/Gels: Gels may be very useful at night to prevent nighttime \"cottonmouth\". 3M™ Xerostomia Relief Spray, Biotène® Moisturizing Mouth Spray/Gel/Liquid  Mouthwash/Toothpaste: Biotene, ACT products    Consider joining Sjogren's Foundation for brochures and resource guides.  There is an online community as well to discuss with others with Sjogren's. https://www.sjogrens.org/member-community    Another great site for product recommendation from [facility] Sjogren's Website: https://www.health.org/sjogrens-syndrome-clinic/tips-to-managing-sjogrens-syndrome/41345

## 2024-01-02 NOTE — PROGRESS NOTES
Rheumatology f/u Patient Note  =====================================================================================================    Chief Complaint   Patient presents with    Follow - Up     LOV 1/13/2022.       PCP  Goyo Santillan MD  Fax: [phone]  Phone: [phone]    =====================================================================================================  HPI    Horace Hoang is a 58 year old male     7 years ago: Diagnosed with Sjogren's.  He used to see Dr. Roldan for Sjogren's.  Tried hydroxychloroquine which did not help.  +dry mouth: very bad. Pilocarpine 5 mg BID didn't help. Not too many carries, but has had +root canals. Biotene and ACT didn't help. Gum helping a bit; no sugar free.   Dry eye: not as bad as mouth.   More fatigue, which is bothersome.   More \"heat rashes\", but otherwise no rashes or photosensitive rashes.   Hx Left parotid MALToma. On  08/18/2016 he underwent left superficial parotidectomy. 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 Staging studies were negative for definitive evidence of systemic disease. No chemo or XRT. Seeing Dr. Cantor in oncology. No relapse noted.   Denies any new areas of joint pain/swelling  Denies any skin rashes.   Denies any new lymphadenopathy  Denies any new peripheral neuropathy  Denies any photosensitivity  Denies any significant fatigue or night sweats or unexpected weight loss.   Denies current alopecia, malar rash, photosensitivity rash, discoid lesions, oral ulcers, pleuritic chest pain, arthritis, seizures/psychosis, Raynaud's, dry eyes/mouth, or blood clots.  ==============================================================================================================  Visit: 11/09/21  Fingers are more painful. Hard to get on rings. We are achy at the end of the day.  Back pain is off and on.   More of a dry cough. No SOB  Dry mouth is  very bad. Was unable to  cevimeline.  Denies any skin rashes.   Denies any new lymphadenopathy  Denies any new peripheral neuropathy  Denies any photosensitivity  Denies any significant fatigue or night sweats or unexpected weight loss.   ==============================================================================================================  Visit: 01/13/22  methotrexate gave abdominal pain and gas.   Cough is better with evoxac  Prednisone was no good for him. Gave him insomnia.   Folic acid was giving the patient gas.   methotrexate was giving gas.  Split the dose methotrexate.  Change folic acid for folinic acid/leucovorin.  To once a week, this didn't help.   ==============================================================================================================  Today's Visit: 01/02/24    -Lost to follow-up for the last 2 years.  Hand pain/swelling improved.  More myalgia in the shoulders and hips.  -Stop taking cevimeline 30 mg 3 times daily. No improvement.  However his xerostomia continues to be an issue.  Seeing dentist, significant mount of cavities due to dry mouth.  Drinking water constantly.-sleep is not good due to dry mouth.   Methotrexate subcutaneous not approved. Leflunomide caused abd pain.   -Overdue for follow-up with Dr. Sanchez for his lymphoma    Denies any new areas of joint pain/swelling  Denies any skin rashes.   Denies any new lymphadenopathy  Denies any new peripheral neuropathy  Denies any photosensitivity  Denies any significant fatigue or night sweats or unexpected weight loss.     5 point ROS negative except noted above    Medications:  Outpatient Medications Marked as Taking for the 1/2/24 encounter (Office Visit) with Raheem Palafox MD   Medication Sig Dispense Refill    hydroxychloroquine 200 MG Oral Tab Take 1 tablet (200 mg total) by mouth 2 (two) times daily. 180 tablet 3    Cevimeline HCl 30 MG Oral Cap Take 1 capsule (30 mg total) by mouth 3 (three) times  daily. 270 capsule 1    Hydrocod Moi-Chlorphe Moi ER 10-8 MG/5ML Oral Suspension Extended Release Take 5 mL by mouth 2 (two) times daily as needed. 150 mL 0    escitalopram 20 MG Oral Tab Take 1 tablet (20 mg total) by mouth daily. 90 tablet 0    nebivolol 2.5 MG Oral Tab Take 1 tablet (2.5 mg total) by mouth daily. 90 tablet 1    Multiple Vitamins-Minerals (MULTIVITAMIN ADULT OR) Take 1 tablet by mouth daily.      Vitamin D3 2000 UNITS Oral Cap Take 1 capsule (2,000 Units total) by mouth daily.       Modified Medications    No medications on file     There are no discontinued medications.  Social History:  Social History     Socioeconomic History    Marital status:    Tobacco Use    Smoking status: Never    Smokeless tobacco: Never   Vaping Use    Vaping Use: Never used   Substance and Sexual Activity    Alcohol use: Yes     Comment: 2x a month    Drug use: No   Other Topics Concern    Caffeine Concern Yes     Comment: less than 1-2 cups daily    Exercise Yes     ?  Allergies:  Allergies   Allergen Reactions    Bactrim HIVES     TABS    Sulfamethoxazole-Trimethoprim HIVES    Alc-Benzyl Alc-Sulfamethoxazole-Trimethoprim HIVES    Sulfamethoxazole W/Trimethoprim     Wellbutrin [Bupropion Hcl] DIZZINESS         Objective    Vitals:    01/02/24 1405   BP: 92/62   Pulse: 76   Resp: 16   Temp: 97 °F (36.1 °C)   SpO2: 97%   Weight: 160 lb (72.6 kg)   Height: 5' 9\" (1.753 m)     GEN: NAD, well-nourished.   HEENT: Head: NCAT. Face: No lesions. Eyes: Conjunctiva clear. Sclera are anicteric. PERRLA. EOMs are full.   -Decrease salivary pooling of the tongue.  Dentition: Multiple fillings  CV: RRR, no mrg, S1/S2  PULM:  CTAB, easy effort  Extremities: No cyanosis, edema or deformities.   Neurologic: Strength, CN2-12 grossly intact   Psych: normal affect.   Skin: No lesions or rashes.  MSK: 28 joint count performed. No evidence of synovitis in mcp, pip, dip, wrist, elbows, shoulders, hips, knees, ankles, mtp unless  otherwise noted. Full ROM of elbows, wrists, knees.     No peripheral joint synovitis.  Resisted shoulder abduction and hip flexion elicits myalgias.  No widespread tenderness      Labs:  Lab Results   Component Value Date    WBC 5.4 12/29/2023    RBC 4.91 12/29/2023    HGB 14.8 12/29/2023    HCT 43.9 12/29/2023    .0 12/29/2023    MCV 89.4 12/29/2023    MCH 30.1 12/29/2023    MCHC 33.7 12/29/2023    RDW 11.4 12/29/2023    NEPRELIM 3.20 12/29/2023    NEPERCENT 59.7 12/29/2023    LYPERCENT 23.1 12/29/2023    MOPERCENT 10.3 12/29/2023    EOPERCENT 6.3 12/29/2023    BAPERCENT 0.4 12/29/2023    NE 3.20 12/29/2023    LYMABS 1.24 12/29/2023    MOABSO 0.55 12/29/2023    EOABSO 0.34 12/29/2023    BAABSO 0.02 12/29/2023     Lab Results   Component Value Date     (H) 12/29/2023    BUN 12 12/29/2023    BUNCREA 13.3 05/24/2021    CREATSERUM 1.14 12/29/2023    ANIONGAP 2 12/29/2023    GFR 80 06/19/2017    GFRNAA 76 04/26/2022    GFRAA 88 04/26/2022    CA 9.2 12/29/2023    OSMOCALC 288 12/29/2023    ALKPHO 98 12/29/2023    AST 32 12/29/2023    ALT 33 12/29/2023    BILT 0.6 12/29/2023    TP 8.0 12/29/2023    ALB 3.7 12/29/2023    GLOBULIN 4.3 12/29/2023    AGRATIO 1.6 02/21/2013     12/29/2023    K 4.2 12/29/2023     12/29/2023    CO2 31.0 12/29/2023         Lab Results   Component Value Date    HIRAM POSITIVE (H) 02/17/2014    ANAS Positive (A) 12/01/2015     Lab Results   Component Value Date     (H) 05/24/2021    ANTISSA 954 (H) 06/10/2014     (H) 05/24/2021    ANTISSB 1,258 (H) 06/10/2014     Lab Results   Component Value Date    DSDNA <100 12/01/2015    ANTIDSDNA 8 06/10/2014    SMUD <100 12/01/2015    ANTISM 35 06/10/2014    RNP <100 12/01/2015     Lab Results   Component Value Date    SCL70 <100 12/01/2015    PLAVYUK49 6 06/10/2014     Lab Results   Component Value Date    C3 115.0 12/29/2023    C4 20.0 12/29/2023     Lab Results   Component Value Date    DRVVT Negative 10/12/2021     [id number] 1.9 10/12/2021    [id number] <2.9 10/12/2021     Lab Results   Component Value Date    CARDIOLIPIGG 3.0 10/12/2021    CARDIOLIPIGM <0.8 10/12/2021         Additional Labs:    5/2021      RF negative  Lyme neg    2016  RF IgA 297  RF IgG 19  RF IgM 137      Radiology:  CT abd/pelvis [facility] in [facility]    Radiology review:        =====================================================================================================  Assessment and Plan    Assessment:  1. Sjogren's syndrome without extraglandular involvement (HCC)    2. Rheumatoid arthritis with positive rheumatoid factor, involving unspecified site (HCC)    3. Xerostomia    4. Arthralgia, unspecified joint    5. Therapeutic drug monitoring        #Seropositive Sjogren's syndrome diagnosed in 2014.    --Clinical manifestations have included dry eye, dry mouth, left parotid MALToma s/p resection in 8/2016 (no systemic disease, no need for chemoRT, no recurrences), inflammatory arthritis, fatigue  --Serologies: positive HIRAM, positive SSA, positive SSB, positive rheumatoid factor.  --Prior medications: Leflunomide (diarrhea/abdominal pain), methotrexate (abdominal pain)  -- Inflammatory arthritis in remission today.  Proximal muscle myalgias in shoulder and hip/thigh are his prominent issue.    #Xerostomia: Failed pilocarpine in the past  -Multiple and worsening dental caries  -Chronic cough waking patient up at night due to severity of sicca    High risk medication labs including CMP and CBC w/ diff reviewed from 12/2023. Results are stable. 10/2021: HBsAg, HBcAB total negative, HCV neg, IGRA neg      Plan:  -Further arthralgia/myalgia/fatigue testing: B12/folate, iron studies, , CK, TSH, vitamin D, obtain UPCR/UA for Sjogren's nephritis monitoring  -Patient tolerated hydroxychloroquine in the past but did not have any efficacy at the time he took the medication several years ago.  Retrial hydroxychloroquine (plaquenil) start: Always  take WITH food. Take 1 tab daily for 2 weeks, and if tolerating, then increase to 1 tab twice daily.  -Depo-Medrol 80 mg intramuscular today for active arthralgia from Sjogren's  -Restart cevimeline 30 mg three times daily for dry mouth.     See Dr. Sanchez again for lymphoma monitoring    Sjogren's Plan:      Dry eyes, try OTC eye drops: Systane, Refresh or Blink. Preservative-free (individually packaged) are the best as preservatives can dry the eyes out.     Dry Mouth Remedies: https://www.sjogrens.org/member-community/product-directory/products-for-dry-mouth   Highlights:   Dry Mouth Lozenges/tabs: ACT Dry mouth lozenges, XyliMelts,TheraMints, MIGHTEAFLOW Dry Mouth Chewing Gum/Lozenges  Dry Mouth Spray/Gels: Gels may be very useful at night to prevent nighttime \"cottonmouth\". 3M™ Xerostomia Relief Spray, Biotène® Moisturizing Mouth Spray/Gel/Liquid  Mouthwash/Toothpaste: Biotene, ACT products    Consider joining Sjogren's Foundation for brochures and resource guides.  There is an online community as well to discuss with others with Sjogren's. https://www.sjogrens.org/member-community    Another great site for product recommendation from [facility] Sjogren's Website: https://www.[facility].org/sjogrens-syndrome-clinic/tips-to-managing-sjogrens-syndrome/32214      Rtc 4 months    Diagnoses and all orders for this visit:    Sjogren's syndrome without extraglandular involvement (HCC)  -     CK (Creatine Kinase) (Not Creatinine); Future  -     TSH W Reflex To Free T4; Future  -     Iron And Tibc; Future  -     Ferritin; Future  -     hydroxychloroquine 200 MG Oral Tab; Take 1 tablet (200 mg total) by mouth 2 (two) times daily.  -     Cevimeline HCl 30 MG Oral Cap; Take 1 capsule (30 mg total) by mouth 3 (three) times daily.  -     methylPREDNISolone acetate (DEPO-medrol) 80 MG/ML injection 80 mg  -     B12 AND FOLATE; Future  -     Urinalysis with Culture Reflex; Future  -     Creatinine, Urine, Random; Future  -      Protein,Total,Urine, Random; Future  -     Vitamin D; Future    Rheumatoid arthritis with positive rheumatoid factor, involving unspecified site (HCC)  -     CK (Creatine Kinase) (Not Creatinine); Future  -     TSH W Reflex To Free T4; Future  -     Iron And Tibc; Future  -     Ferritin; Future  -     hydroxychloroquine 200 MG Oral Tab; Take 1 tablet (200 mg total) by mouth 2 (two) times daily.  -     Cevimeline HCl 30 MG Oral Cap; Take 1 capsule (30 mg total) by mouth 3 (three) times daily.  -     methylPREDNISolone acetate (DEPO-medrol) 80 MG/ML injection 80 mg  -     B12 AND FOLATE; Future  -     Urinalysis with Culture Reflex; Future  -     Creatinine, Urine, Random; Future  -     Protein,Total,Urine, Random; Future  -     Vitamin D; Future    Xerostomia  -     CK (Creatine Kinase) (Not Creatinine); Future  -     TSH W Reflex To Free T4; Future  -     Iron And Tibc; Future  -     Ferritin; Future  -     hydroxychloroquine 200 MG Oral Tab; Take 1 tablet (200 mg total) by mouth 2 (two) times daily.  -     Cevimeline HCl 30 MG Oral Cap; Take 1 capsule (30 mg total) by mouth 3 (three) times daily.  -     methylPREDNISolone acetate (DEPO-medrol) 80 MG/ML injection 80 mg  -     B12 AND FOLATE; Future  -     Urinalysis with Culture Reflex; Future  -     Creatinine, Urine, Random; Future  -     Protein,Total,Urine, Random; Future  -     Vitamin D; Future    Arthralgia, unspecified joint  -     Vitamin D; Future    Therapeutic drug monitoring  -     Vitamin D; Future          No follow-ups on file.      The above plan of care, diagnosis, orders, and follow-up were discussed with the patient. Questions related to this recommended plan of care were answered.    Thank you for referring this delightful patient to me. Please feel free to contact me with any questions.     This report was performed utilizing speech recognition software technology. Despite proofreading, speech recognition errors could escape detection. If a  word or phrase is confusing or out of context, please do not hesitate to call for   clarification.       Kind regards      Raheem Palafox MD  [facility]

## 2024-01-03 LAB
ALBUMIN SERPL ELPH-MCNC: 4.25 G/DL (ref 3.75–5.21)
ALBUMIN/GLOB SERPL: 1.16 {RATIO} (ref 1–2)
ALPHA1 GLOB SERPL ELPH-MCNC: 0.23 G/DL (ref 0.19–0.46)
ALPHA2 GLOB SERPL ELPH-MCNC: 0.86 G/DL (ref 0.48–1.05)
B-GLOBULIN SERPL ELPH-MCNC: 0.85 G/DL (ref 0.68–1.23)
GAMMA GLOB SERPL ELPH-MCNC: 1.71 G/DL (ref 0.62–1.7)
KAPPA LC FREE SER-MCNC: 4.62 MG/DL (ref 0.33–1.94)
KAPPA LC FREE/LAMBDA FREE SER NEPH: 1.99 {RATIO} (ref 0.26–1.65)
LAMBDA LC FREE SERPL-MCNC: 2.32 MG/DL (ref 0.57–2.63)
PROT SERPL-MCNC: 7.9 G/DL (ref 5.7–8.2)

## 2024-02-07 DIAGNOSIS — R45.89 ANXIETY ABOUT HEALTH: ICD-10-CM

## 2024-02-08 RX ORDER — ESCITALOPRAM OXALATE 20 MG/1
20 TABLET ORAL DAILY
Qty: 30 TABLET | Refills: 0 | Status: SHIPPED | OUTPATIENT
Start: 2024-02-08

## 2024-03-25 ENCOUNTER — TELEPHONE (OUTPATIENT)
Dept: RHEUMATOLOGY | Facility: CLINIC | Age: 59
End: 2024-03-25

## 2024-03-25 DIAGNOSIS — M05.9 RHEUMATOID ARTHRITIS WITH POSITIVE RHEUMATOID FACTOR, INVOLVING UNSPECIFIED SITE (HCC): ICD-10-CM

## 2024-03-25 DIAGNOSIS — M35.00 SJOGREN'S SYNDROME WITHOUT EXTRAGLANDULAR INVOLVEMENT (HCC): Primary | ICD-10-CM

## 2024-03-25 RX ORDER — METHYLPREDNISOLONE ACETATE 80 MG/ML
80 INJECTION, SUSPENSION INTRA-ARTICULAR; INTRALESIONAL; INTRAMUSCULAR; SOFT TISSUE ONCE
Status: SHIPPED | OUTPATIENT
Start: 2024-03-25

## 2024-03-26 ENCOUNTER — NURSE ONLY (OUTPATIENT)
Dept: RHEUMATOLOGY | Facility: CLINIC | Age: 59
End: 2024-03-26
Payer: COMMERCIAL

## 2024-03-26 DIAGNOSIS — M05.9 RHEUMATOID ARTHRITIS WITH POSITIVE RHEUMATOID FACTOR, INVOLVING UNSPECIFIED SITE (HCC): Primary | ICD-10-CM

## 2024-03-26 PROCEDURE — 96372 THER/PROPH/DIAG INJ SC/IM: CPT | Performed by: INTERNAL MEDICINE

## 2024-03-26 RX ADMIN — METHYLPREDNISOLONE ACETATE 80 MG: 80 INJECTION, SUSPENSION INTRA-ARTICULAR; INTRALESIONAL; INTRAMUSCULAR; SOFT TISSUE at 10:01:00

## 2024-03-26 NOTE — PROGRESS NOTES
Pt here for Depo injection. Depo 80mg to right ventro gluteal region given as ordered. Pt tolerated without incidence.

## 2024-04-05 DIAGNOSIS — R45.89 ANXIETY ABOUT HEALTH: ICD-10-CM

## 2024-04-08 RX ORDER — ESCITALOPRAM OXALATE 20 MG/1
20 TABLET ORAL DAILY
Qty: 30 TABLET | Refills: 0 | Status: SHIPPED | OUTPATIENT
Start: 2024-04-08

## 2024-04-23 ENCOUNTER — HOSPITAL ENCOUNTER (EMERGENCY)
Age: 59
Discharge: HOME | End: 2024-04-23
Attending: EMERGENCY MEDICINE
Payer: COMMERCIAL

## 2024-04-23 ENCOUNTER — APPOINTMENT (OUTPATIENT)
Dept: ULTRASOUND IMAGING | Age: 59
End: 2024-04-23
Attending: EMERGENCY MEDICINE
Payer: COMMERCIAL

## 2024-04-23 VITALS
HEIGHT: 69 IN | RESPIRATION RATE: 16 BRPM | TEMPERATURE: 98 F | WEIGHT: 160 LBS | HEART RATE: 71 BPM | OXYGEN SATURATION: 99 % | SYSTOLIC BLOOD PRESSURE: 126 MMHG | DIASTOLIC BLOOD PRESSURE: 76 MMHG | BODY MASS INDEX: 23.7 KG/M2

## 2024-04-23 DIAGNOSIS — S76.911A MUSCLE STRAIN OF RIGHT THIGH, INITIAL ENCOUNTER: Primary | ICD-10-CM

## 2024-04-23 DIAGNOSIS — I83.90 VARICOSE VEINS: ICD-10-CM

## 2024-04-23 PROCEDURE — 93971 EXTREMITY STUDY: CPT | Performed by: EMERGENCY MEDICINE

## 2024-04-23 PROCEDURE — 99284 EMERGENCY DEPT VISIT MOD MDM: CPT

## 2024-04-23 PROCEDURE — 99283 EMERGENCY DEPT VISIT LOW MDM: CPT

## 2024-04-23 NOTE — ED INITIAL ASSESSMENT (HPI)
Pt with pulled groin a few days ago, patient has now noticed some \"bumps\" along what he suspects are the veins to his right thigh. Patient denies any leg/calf pain, just tender to groin.

## 2024-04-23 NOTE — ED PROVIDER NOTES
Patient Seen in: [facility] In [place]      History     Chief Complaint   Patient presents with    Groin Pain     Stated Complaint: groin pain, \"bumps\" in veins    Subjective:   HPI    59-year-old male presents today for evaluation of right thigh pain.  1 week ago, patient began to notice a discomfort in his right medial thigh.  He thought it was secondary to his muscular strain.  Today, he noticed some bulging bumps in his hamstring.  He denies any fevers, injury, chest pain or shortness of breath.    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)    Hypertension    Sjogren's syndrome (HCC)    Visual impairment    glasses for distance              Past Surgical History:   Procedure Laterality Date    Other surgical history      right shoulder arthroscopy    Other surgical history      right knee arthroscopy    Tympanoplas/mastoidec,intact wall Right 4/6/2015    Procedure: TYMPANOMASTOIDECTOMY W/O OSSICULAR CHAIN RECONSTRUCTION;  Surgeon: Julio Bird MD;  Location: [facility] [place] [facility]                Social History     Socioeconomic History    Marital status:    Tobacco Use    Smoking status: Never    Smokeless tobacco: Never   Vaping Use    Vaping status: Never Used   Substance and Sexual Activity    Alcohol use: Yes     Comment: 2x a month    Drug use: No   Other Topics Concern    Caffeine Concern Yes     Comment: less than 1-2 cups daily    Exercise Yes              Review of Systems    Positive for stated complaint: groin pain, \"bumps\" in veins  Other systems are as noted in HPI.  Constitutional and vital signs reviewed.      All other systems reviewed and negative except as noted above.    Physical Exam     ED Triage Vitals [04/23/24 1741]   /76   Pulse 71   Resp 16   Temp 97.7 °F (36.5 °C)   Temp src Oral    SpO2 99 %   O2 Device None (Room air)       Current:/76   Pulse 71   Temp 97.7 °F (36.5 °C) (Oral)   Resp 16   Ht 175.3 cm (5' 9\")   Wt 72.6 kg   SpO2 99%   BMI 23.63 kg/m²         Physical Exam  Vitals and nursing note reviewed.   Constitutional:       Appearance: Normal appearance.   HENT:      Head: Normocephalic.      Nose: Nose normal.      Mouth/Throat:      Mouth: Mucous membranes are moist.   Eyes:      Extraocular Movements: Extraocular movements intact.   Cardiovascular:      Rate and Rhythm: Normal rate and regular rhythm.   Pulmonary:      Effort: Pulmonary effort is normal.      Breath sounds: Normal breath sounds.   Abdominal:      General: Abdomen is flat.   Musculoskeletal:         General: Normal range of motion.      Comments: Varicose veins noted over the right posterior hamstring.  His thigh is soft.  There is no skin changes or bruising.  No significant asymmetric swelling   Skin:     General: Skin is warm.   Neurological:      General: No focal deficit present.      Mental Status: He is alert.   Psychiatric:         Mood and Affect: Mood normal.         ED Course   Labs Reviewed - No data to display          US VENOUS DOPPLER LEG RIGHT - DIAG IMG (CPT=93971)    Result Date: 4/23/2024  PROCEDURE:  US VENOUS DOPPLER LEG RIGHT - DIAG IMG (CPT=93971)  COMPARISON:  None.  INDICATIONS:  eval for DVT  TECHNIQUE:  Real time, grey scale, and duplex ultrasound was used to evaluate the lower extremity venous system. B-mode two-dimensional images of the vascular structures, Doppler spectral analysis, and color flow.  Doppler imaging were performed.  The following veins were imaged:  Common, deep, and superficial femoral, popliteal, sapheno-femoral junction, posterior tibial veins, and the contralateral common femoral vein.  PATIENT STATED HISTORY: (As transcribed by Technologist)  Patient presents with right groin pain.    FINDINGS:  EXTREMITY EXAMINED:  Right lower extremity SAPHENOFEMORAL  JUNCTION:  No reflux. THROMBI:  None visible. COMPRESSION:  Normal compressibility, phasicity, and augmentation. OTHER:  Negative.            CONCLUSION:  No DVT right lower extremity.   LOCATION:  [facility]    Dictated by (CST): Zafar Camarena MD on 4/23/2024 at 6:54 PM     Finalized by (CST): Zafar Camarena MD on 4/23/2024 at 6:54 PM               [facility]      Differential Diagnosis  59-year-old male presents today after having a right thigh pain and then noticing bumps in his right hamstring area.  The bumps appear to be varicose veins.  He has no calf tenderness.  His calf and thigh are soft.  His right lower extremity is warm and well-perfused.  Differential would include muscular strain with the varicose veins versus DVT.  Will obtain ultrasound and reassess.    6:58 pm  Ultrasound negative for DVT.  Patient remains well-appearing.  Will discharge with recommendation for PCP follow-up.                                   Medical Decision Making      Disposition and Plan     Clinical Impression:  1. Muscle strain of right thigh, initial encounter    2. Varicose veins         Disposition:  Discharge  4/23/2024  6:59 pm    Follow-up:  Goyo Santillan MD  [id number]  [facility] [address]  [phone]    Call in 1 day(s)            Medications Prescribed:  Current Discharge Medication List

## 2024-04-23 NOTE — DISCHARGE INSTRUCTIONS
Follow-up with your primary care doctor within the next week for reassessment.  Return for new or worsening pain or any other concerning symptoms.

## 2024-05-01 DIAGNOSIS — R45.89 ANXIETY ABOUT HEALTH: ICD-10-CM

## 2024-05-02 RX ORDER — ESCITALOPRAM OXALATE 20 MG/1
20 TABLET ORAL DAILY
Qty: 30 TABLET | Refills: 0 | Status: SHIPPED | OUTPATIENT
Start: 2024-05-02

## 2024-06-03 RX ORDER — NEBIVOLOL 2.5 MG/1
2.5 TABLET ORAL DAILY
Qty: 90 TABLET | Refills: 1 | Status: SHIPPED | OUTPATIENT
Start: 2024-06-03

## 2024-06-04 ENCOUNTER — OFFICE VISIT (OUTPATIENT)
Dept: FAMILY MEDICINE CLINIC | Facility: CLINIC | Age: 59
End: 2024-06-04
Payer: COMMERCIAL

## 2024-06-04 VITALS
WEIGHT: 160 LBS | HEIGHT: 69 IN | DIASTOLIC BLOOD PRESSURE: 60 MMHG | BODY MASS INDEX: 23.7 KG/M2 | SYSTOLIC BLOOD PRESSURE: 102 MMHG | HEART RATE: 68 BPM | OXYGEN SATURATION: 98 % | TEMPERATURE: 98 F | RESPIRATION RATE: 18 BRPM

## 2024-06-04 DIAGNOSIS — Z00.00 ANNUAL PHYSICAL EXAM: Primary | ICD-10-CM

## 2024-06-04 DIAGNOSIS — M05.9 RHEUMATOID ARTHRITIS WITH POSITIVE RHEUMATOID FACTOR, INVOLVING UNSPECIFIED SITE (HCC): ICD-10-CM

## 2024-06-04 DIAGNOSIS — Z12.11 SCREENING FOR COLON CANCER: ICD-10-CM

## 2024-06-04 DIAGNOSIS — Z12.5 SCREENING FOR PROSTATE CANCER: ICD-10-CM

## 2024-06-04 DIAGNOSIS — R45.89 ANXIETY ABOUT HEALTH: ICD-10-CM

## 2024-06-04 DIAGNOSIS — M35.00 SJOGREN'S SYNDROME WITHOUT EXTRAGLANDULAR INVOLVEMENT (HCC): ICD-10-CM

## 2024-06-04 PROCEDURE — 99396 PREV VISIT EST AGE 40-64: CPT | Performed by: FAMILY MEDICINE

## 2024-06-04 PROCEDURE — 3074F SYST BP LT 130 MM HG: CPT | Performed by: FAMILY MEDICINE

## 2024-06-04 PROCEDURE — 3008F BODY MASS INDEX DOCD: CPT | Performed by: FAMILY MEDICINE

## 2024-06-04 PROCEDURE — 3078F DIAST BP <80 MM HG: CPT | Performed by: FAMILY MEDICINE

## 2024-06-04 RX ORDER — ESCITALOPRAM OXALATE 20 MG/1
20 TABLET ORAL DAILY
Qty: 90 TABLET | Refills: 2 | Status: SHIPPED | OUTPATIENT
Start: 2024-06-04

## 2024-06-04 RX ORDER — NEBIVOLOL 2.5 MG/1
2.5 TABLET ORAL DAILY
Qty: 90 TABLET | Refills: 2 | Status: SHIPPED | OUTPATIENT
Start: 2024-06-04

## 2024-06-07 NOTE — PROGRESS NOTES
HPI:    Horace Hoang is a 59 year old male who presents for Physical (Refills. 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   He  has a past surgical history that includes tympanoplas/mastoidec,intact wall (Right, 4/6/2015); other surgical history; and other surgical history.   His family history includes ADHD in his son; Bipolar Disorder in his son; Breast Cancer in his mother; Cancer in his mother; Renal Disease in his brother.   He  reports that he has never smoked. He has never used smokeless tobacco. He reports current alcohol use. He reports that he does not use drugs.     He is not on any long-term medications.   He is allergic to bactrim, sulfamethoxazole-trimethoprim, alc-benzyl alc-sulfamethoxazole-trimethoprim, sulfamethoxazole w/trimethoprim, and wellbutrin [bupropion hcl].     Current Outpatient Medications on File Prior to Visit   Medication Sig    hydroxychloroquine 200 MG Oral Tab Take 1 tablet (200 mg total) by mouth 2 (two) times daily.    Cevimeline HCl 30 MG Oral Cap Take 1 capsule (30 mg total) by mouth 3 (three) times daily.    Hydrocod Moi-Chlorphe Moi ER 10-8 MG/5ML Oral Suspension Extended Release Take 5 mL by mouth 2 (two) times daily as needed.    Multiple Vitamins-Minerals (MULTIVITAMIN ADULT OR) Take 1 tablet by mouth daily.    Vitamin D3 2000 UNITS Oral Cap Take 1 capsule (2,000 Units total) by mouth daily.     No current facility-administered medications on file prior to visit.         REVIEW OF SYSTEMS:   Patient denies shortness of breath, denies chest pain and denies any recent fevers or chills.    Patient reports no urinary complaints and denies headaches or visual disturbances.   Patient denies any abdominal pain at  this time. Patient has no new skin lesions.  Patient reports no acute back pain and reports no dizziness or headaches.   Patient reports no visual disturbances and reports hearing has been about the same.   Patient reports no recent injury or trauma.               EXAM:    /60   Pulse 68   Temp 97.7 °F (36.5 °C)   Resp 18   Ht 5' 9\" (1.753 m)   Wt 160 lb (72.6 kg)   SpO2 98%   BMI 23.63 kg/m²  Estimated body mass index is 23.63 kg/m² as calculated from the following:    Height as of this encounter: 5' 9\" (1.753 m).    Weight as of this encounter: 160 lb (72.6 kg).    General Appearance:  Alert, cooperative, no distress, appears stated age   Head:  Normocephalic, without obvious abnormality, atraumatic   Eyes:  conjunctiva/cornea is not erythematous.        Nose: No nasal drainage.    Throat: No erythema    Neck: Supple, symmetrical, trachea midline, and normal ROM  thyroid: no obvious nodules   Back:   Symmetric, no curvature, ROM normal, no CVA tenderness   Lungs:   Clear to auscultation bilaterally, respirations unlabored   Chest Wall:  No tenderness or deformity   Heart:  Regular rate and rhythm, S1, S2 normal, no murmur,   Abdomen:   Soft, non-tender, bowel sounds active. No hernia.    Genitalia:     Rectal:     Extremities: Extremities normal, atraumatic, no cyanosis or edema   Pulses: 2+ and symmetric   Skin: Skin color, texture, turgor normal, no new rashes    Lymph nodes: No obvious cervical adenopathy.    Neurologic and psych: Normal speech, Alert and oriented x 3.   Normal mood, normal insight and judgment.                    ASSESSMENT AND PLAN:   1. Annual physical exam  -wellness visit was done  - CBC With Differential With Platelet; Future  - Comp Metabolic Panel (14); Future  - Lipid Panel; Future  - TSH W Reflex To Free T4; Future    2. Screening for colon cancer  -cologuard as below:  - COLOGUARD COLON CANCER SCREENING (EXTERNAL)    3. Screening for prostate cancer    - PSA Total,  Screen; Future    4. Anxiety about health  -lexapro to continue as below:  - escitalopram 20 MG Oral Tab; Take 1 tablet (20 mg total) by mouth daily.  Dispense: 90 tablet; Refill: 2    5. Rheumatoid arthritis with positive rheumatoid factor, involving unspecified site (HCC)  -stable, CPM.     6. Sjogren's syndrome without extraglandular involvement (HCC)  -stable, CPM.      Follow up in 6-12 months, sooner prn.     Goyo Santillan MD, 6/7/2024, 11:43 AM         Note to patient: The 21st Century Cures Act makes medical notes like these available to patients in the interest of transparency. However, this is a medical document intended as peer to peer communication. It is written in medical language and may contain abbreviations or verbiage that are unfamiliar. It may appear blunt or direct. Medical documents are intended to carry relevant information, facts as evident, and the clinical opinion of the practitioner who signs the document.

## 2024-06-13 ENCOUNTER — LAB ENCOUNTER (OUTPATIENT)
Dept: LAB | Age: 59
End: 2024-06-13
Attending: FAMILY MEDICINE
Payer: COMMERCIAL

## 2024-06-13 DIAGNOSIS — Z00.00 ANNUAL PHYSICAL EXAM: ICD-10-CM

## 2024-06-13 DIAGNOSIS — Z12.5 SCREENING FOR PROSTATE CANCER: ICD-10-CM

## 2024-06-13 LAB
ALBUMIN SERPL-MCNC: 4 G/DL (ref 3.4–5)
ALBUMIN/GLOB SERPL: 0.9 {RATIO} (ref 1–2)
ALP LIVER SERPL-CCNC: 101 U/L
ALT SERPL-CCNC: 37 U/L
ANION GAP SERPL CALC-SCNC: 3 MMOL/L (ref 0–18)
AST SERPL-CCNC: 39 U/L (ref 15–37)
BASOPHILS # BLD AUTO: 0.02 X10(3) UL (ref 0–0.2)
BASOPHILS NFR BLD AUTO: 0.4 %
BILIRUB SERPL-MCNC: 1 MG/DL (ref 0.1–2)
BUN BLD-MCNC: 17 MG/DL (ref 9–23)
CALCIUM BLD-MCNC: 9.2 MG/DL (ref 8.5–10.1)
CHLORIDE SERPL-SCNC: 106 MMOL/L (ref 98–112)
CHOLEST SERPL-MCNC: 208 MG/DL (ref ?–200)
CO2 SERPL-SCNC: 29 MMOL/L (ref 21–32)
COMPLEXED PSA SERPL-MCNC: 1.85 NG/ML (ref ?–4)
CREAT BLD-MCNC: 1.16 MG/DL
EGFRCR SERPLBLD CKD-EPI 2021: 73 ML/MIN/1.73M2 (ref 60–?)
EOSINOPHIL # BLD AUTO: 0.22 X10(3) UL (ref 0–0.7)
EOSINOPHIL NFR BLD AUTO: 4.6 %
ERYTHROCYTE [DISTWIDTH] IN BLOOD BY AUTOMATED COUNT: 11.7 %
FASTING PATIENT LIPID ANSWER: YES
FASTING STATUS PATIENT QL REPORTED: YES
GLOBULIN PLAS-MCNC: 4.3 G/DL (ref 2.8–4.4)
GLUCOSE BLD-MCNC: 94 MG/DL (ref 70–99)
HCT VFR BLD AUTO: 43.5 %
HDLC SERPL-MCNC: 39 MG/DL (ref 40–59)
HGB BLD-MCNC: 14.6 G/DL
IMM GRANULOCYTES # BLD AUTO: 0.01 X10(3) UL (ref 0–1)
IMM GRANULOCYTES NFR BLD: 0.2 %
LDLC SERPL CALC-MCNC: 152 MG/DL (ref ?–100)
LYMPHOCYTES # BLD AUTO: 0.95 X10(3) UL (ref 1–4)
LYMPHOCYTES NFR BLD AUTO: 19.9 %
MCH RBC QN AUTO: 30.9 PG (ref 26–34)
MCHC RBC AUTO-ENTMCNC: 33.6 G/DL (ref 31–37)
MCV RBC AUTO: 92.2 FL
MONOCYTES # BLD AUTO: 0.62 X10(3) UL (ref 0.1–1)
MONOCYTES NFR BLD AUTO: 13 %
NEUTROPHILS # BLD AUTO: 2.96 X10 (3) UL (ref 1.5–7.7)
NEUTROPHILS # BLD AUTO: 2.96 X10(3) UL (ref 1.5–7.7)
NEUTROPHILS NFR BLD AUTO: 61.9 %
NONHDLC SERPL-MCNC: 169 MG/DL (ref ?–130)
OSMOLALITY SERPL CALC.SUM OF ELEC: 287 MOSM/KG (ref 275–295)
PLATELET # BLD AUTO: 269 10(3)UL (ref 150–450)
POTASSIUM SERPL-SCNC: 4.7 MMOL/L (ref 3.5–5.1)
PROT SERPL-MCNC: 8.3 G/DL (ref 6.4–8.2)
RBC # BLD AUTO: 4.72 X10(6)UL
SODIUM SERPL-SCNC: 138 MMOL/L (ref 136–145)
TRIGL SERPL-MCNC: 93 MG/DL (ref 30–149)
TSI SER-ACNC: 1.79 MIU/ML (ref 0.36–3.74)
VLDLC SERPL CALC-MCNC: 18 MG/DL (ref 0–30)
WBC # BLD AUTO: 4.8 X10(3) UL (ref 4–11)

## 2024-06-13 PROCEDURE — 36415 COLL VENOUS BLD VENIPUNCTURE: CPT

## 2024-06-13 PROCEDURE — 85025 COMPLETE CBC W/AUTO DIFF WBC: CPT

## 2024-06-13 PROCEDURE — 84443 ASSAY THYROID STIM HORMONE: CPT

## 2024-06-13 PROCEDURE — 80061 LIPID PANEL: CPT

## 2024-06-13 PROCEDURE — 80053 COMPREHEN METABOLIC PANEL: CPT

## 2024-07-02 ENCOUNTER — APPOINTMENT (OUTPATIENT)
Dept: GENERAL RADIOLOGY | Age: 59
End: 2024-07-02
Attending: EMERGENCY MEDICINE
Payer: COMMERCIAL

## 2024-07-02 ENCOUNTER — HOSPITAL ENCOUNTER (EMERGENCY)
Age: 59
Discharge: HOME | End: 2024-07-02
Attending: EMERGENCY MEDICINE
Payer: COMMERCIAL

## 2024-07-02 ENCOUNTER — APPOINTMENT (OUTPATIENT)
Dept: CT IMAGING | Age: 59
End: 2024-07-02
Attending: EMERGENCY MEDICINE
Payer: COMMERCIAL

## 2024-07-02 VITALS
WEIGHT: 160 LBS | HEART RATE: 78 BPM | RESPIRATION RATE: 18 BRPM | OXYGEN SATURATION: 98 % | DIASTOLIC BLOOD PRESSURE: 69 MMHG | BODY MASS INDEX: 23.7 KG/M2 | SYSTOLIC BLOOD PRESSURE: 118 MMHG | HEIGHT: 69 IN | TEMPERATURE: 99 F

## 2024-07-02 DIAGNOSIS — J18.9 PNEUMONIA DUE TO INFECTIOUS ORGANISM, UNSPECIFIED LATERALITY, UNSPECIFIED PART OF LUNG: ICD-10-CM

## 2024-07-02 DIAGNOSIS — R10.9 LEFT FLANK PAIN: Primary | ICD-10-CM

## 2024-07-02 LAB
ALBUMIN SERPL-MCNC: 3.8 G/DL (ref 3.4–5)
ALBUMIN/GLOB SERPL: 0.9 {RATIO} (ref 1–2)
ALP LIVER SERPL-CCNC: 108 U/L
ALT SERPL-CCNC: 35 U/L
ANION GAP SERPL CALC-SCNC: 5 MMOL/L (ref 0–18)
AST SERPL-CCNC: 35 U/L (ref 15–37)
BASOPHILS # BLD AUTO: 0.02 X10(3) UL (ref 0–0.2)
BASOPHILS NFR BLD AUTO: 0.3 %
BILIRUB SERPL-MCNC: 0.6 MG/DL (ref 0.1–2)
BILIRUB UR QL STRIP.AUTO: NEGATIVE
BUN BLD-MCNC: 21 MG/DL (ref 9–23)
CALCIUM BLD-MCNC: 9.7 MG/DL (ref 8.5–10.1)
CHLORIDE SERPL-SCNC: 104 MMOL/L (ref 98–112)
CLARITY UR REFRACT.AUTO: CLEAR
CO2 SERPL-SCNC: 26 MMOL/L (ref 21–32)
COLOR UR AUTO: YELLOW
CREAT BLD-MCNC: 1.32 MG/DL
EGFRCR SERPLBLD CKD-EPI 2021: 63 ML/MIN/1.73M2 (ref 60–?)
EOSINOPHIL # BLD AUTO: 0.18 X10(3) UL (ref 0–0.7)
EOSINOPHIL NFR BLD AUTO: 2.8 %
ERYTHROCYTE [DISTWIDTH] IN BLOOD BY AUTOMATED COUNT: 11.7 %
GLOBULIN PLAS-MCNC: 4.2 G/DL (ref 2.8–4.4)
GLUCOSE BLD-MCNC: 120 MG/DL (ref 70–99)
GLUCOSE UR STRIP.AUTO-MCNC: NEGATIVE MG/DL
HCT VFR BLD AUTO: 40.6 %
HGB BLD-MCNC: 14.6 G/DL
IMM GRANULOCYTES # BLD AUTO: 0.02 X10(3) UL (ref 0–1)
IMM GRANULOCYTES NFR BLD: 0.3 %
LEUKOCYTE ESTERASE UR QL STRIP.AUTO: NEGATIVE
LIPASE SERPL-CCNC: 49 U/L (ref 13–75)
LYMPHOCYTES # BLD AUTO: 1.16 X10(3) UL (ref 1–4)
LYMPHOCYTES NFR BLD AUTO: 18.2 %
MCH RBC QN AUTO: 31.2 PG (ref 26–34)
MCHC RBC AUTO-ENTMCNC: 36 G/DL (ref 31–37)
MCV RBC AUTO: 86.8 FL
MONOCYTES # BLD AUTO: 0.63 X10(3) UL (ref 0.1–1)
MONOCYTES NFR BLD AUTO: 9.9 %
NEUTROPHILS # BLD AUTO: 4.36 X10 (3) UL (ref 1.5–7.7)
NEUTROPHILS # BLD AUTO: 4.36 X10(3) UL (ref 1.5–7.7)
NEUTROPHILS NFR BLD AUTO: 68.5 %
NITRITE UR QL STRIP.AUTO: NEGATIVE
OSMOLALITY SERPL CALC.SUM OF ELEC: 284 MOSM/KG (ref 275–295)
PH UR STRIP.AUTO: 7 [PH] (ref 5–8)
PLATELET # BLD AUTO: 222 10(3)UL (ref 150–450)
POCT INFLUENZA A: NEGATIVE
POCT INFLUENZA B: NEGATIVE
POTASSIUM SERPL-SCNC: 3.8 MMOL/L (ref 3.5–5.1)
PROT SERPL-MCNC: 8 G/DL (ref 6.4–8.2)
PROT UR STRIP.AUTO-MCNC: NEGATIVE MG/DL
RBC # BLD AUTO: 4.68 X10(6)UL
RBC UR QL AUTO: NEGATIVE
SARS-COV-2 RNA RESP QL NAA+PROBE: NOT DETECTED
SODIUM SERPL-SCNC: 135 MMOL/L (ref 136–145)
SP GR UR STRIP.AUTO: 1.01 (ref 1–1.03)
UROBILINOGEN UR STRIP.AUTO-MCNC: 0.2 MG/DL
WBC # BLD AUTO: 6.4 X10(3) UL (ref 4–11)

## 2024-07-02 PROCEDURE — 74177 CT ABD & PELVIS W/CONTRAST: CPT | Performed by: EMERGENCY MEDICINE

## 2024-07-02 PROCEDURE — 87502 INFLUENZA DNA AMP PROBE: CPT | Performed by: EMERGENCY MEDICINE

## 2024-07-02 PROCEDURE — 99285 EMERGENCY DEPT VISIT HI MDM: CPT

## 2024-07-02 PROCEDURE — 83690 ASSAY OF LIPASE: CPT | Performed by: EMERGENCY MEDICINE

## 2024-07-02 PROCEDURE — 71045 X-RAY EXAM CHEST 1 VIEW: CPT | Performed by: EMERGENCY MEDICINE

## 2024-07-02 PROCEDURE — 81003 URINALYSIS AUTO W/O SCOPE: CPT | Performed by: EMERGENCY MEDICINE

## 2024-07-02 PROCEDURE — 96374 THER/PROPH/DIAG INJ IV PUSH: CPT

## 2024-07-02 PROCEDURE — 99284 EMERGENCY DEPT VISIT MOD MDM: CPT

## 2024-07-02 PROCEDURE — 85025 COMPLETE CBC W/AUTO DIFF WBC: CPT | Performed by: EMERGENCY MEDICINE

## 2024-07-02 PROCEDURE — 96361 HYDRATE IV INFUSION ADD-ON: CPT

## 2024-07-02 PROCEDURE — 96375 TX/PRO/DX INJ NEW DRUG ADDON: CPT

## 2024-07-02 PROCEDURE — 80053 COMPREHEN METABOLIC PANEL: CPT | Performed by: EMERGENCY MEDICINE

## 2024-07-02 RX ORDER — LIDOCAINE 50 MG/G
1 PATCH TOPICAL EVERY 24 HOURS
Qty: 10 PATCH | Refills: 0 | Status: SHIPPED | OUTPATIENT
Start: 2024-07-02 | End: 2024-07-12

## 2024-07-02 RX ORDER — MORPHINE SULFATE 4 MG/ML
4 INJECTION, SOLUTION INTRAMUSCULAR; INTRAVENOUS ONCE
Status: COMPLETED | OUTPATIENT
Start: 2024-07-02 | End: 2024-07-02

## 2024-07-02 RX ORDER — NAPROXEN 500 MG/1
500 TABLET ORAL 2 TIMES DAILY PRN
Qty: 14 TABLET | Refills: 0 | Status: SHIPPED | OUTPATIENT
Start: 2024-07-02 | End: 2024-07-09

## 2024-07-02 RX ORDER — AMOXICILLIN AND CLAVULANATE POTASSIUM 875; 125 MG/1; MG/1
1 TABLET, FILM COATED ORAL 2 TIMES DAILY
Qty: 10 TABLET | Refills: 0 | Status: SHIPPED | OUTPATIENT
Start: 2024-07-02 | End: 2024-07-07

## 2024-07-02 RX ORDER — AZITHROMYCIN 250 MG/1
TABLET, FILM COATED ORAL
Qty: 6 TABLET | Refills: 0 | Status: SHIPPED | OUTPATIENT
Start: 2024-07-02 | End: 2024-07-07

## 2024-07-02 RX ORDER — HYDROMORPHONE HYDROCHLORIDE 1 MG/ML
0.5 INJECTION, SOLUTION INTRAMUSCULAR; INTRAVENOUS; SUBCUTANEOUS ONCE
Status: COMPLETED | OUTPATIENT
Start: 2024-07-02 | End: 2024-07-02

## 2024-07-02 RX ORDER — DIAZEPAM 2 MG/1
2 TABLET ORAL 3 TIMES DAILY PRN
Qty: 8 TABLET | Refills: 0 | Status: SHIPPED | OUTPATIENT
Start: 2024-07-02

## 2024-07-02 RX ORDER — KETOROLAC TROMETHAMINE 30 MG/ML
30 INJECTION, SOLUTION INTRAMUSCULAR; INTRAVENOUS ONCE
Status: COMPLETED | OUTPATIENT
Start: 2024-07-02 | End: 2024-07-02

## 2024-07-02 NOTE — ED INITIAL ASSESSMENT (HPI)
Sudden onset of sob with left sided intermittent sharp flank pain. Sob started today within the last 15 min. Flank pain has been going on for a few days. Denies fever/ urinary symptoms/recent fever. O2 sat 99%

## 2024-07-02 NOTE — ED PROVIDER NOTES
Patient Seen in: [facility] [place]      History     Chief Complaint   Patient presents with    Abdomen/Flank Pain    Difficulty Breathing     Stated Complaint: left flank pain that began a few days ago, today is worst. pt also states withi*    Subjective:   HPI  Patient is a 59-year-old male with a history of A-fib not on anticoagulation, hypertension, Sjogren's, anxiety presents the ED for evaluation of left flank pain worsening throughout today.  Patient reports pain was fluctuating, stabbing pain nonradiating.  No alleviating factors.  Worse with certain movements.  No history of similar pain.  He denies any associated chest pain but states that the pain was so severe that it made him short of breath.  Denies any nausea, vomiting, abdominal pain, testicular swelling or pain, focal weakness or numbness.  No dysuria or hematuria.  No injuries or falls.  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)    Hypertension    Sjogren's syndrome (HCC)    Visual impairment    glasses for distance              Past Surgical History:   Procedure Laterality Date    Other surgical history      right shoulder arthroscopy    Other surgical history      right knee arthroscopy    Tympanoplas/mastoidec,intact wall Right 4/6/2015    Procedure: TYMPANOMASTOIDECTOMY W/O OSSICULAR CHAIN RECONSTRUCTION;  Surgeon: Julio Bird MD;  Location: [facility] [place] [facility]                Social History     Socioeconomic History    Marital status:    Tobacco Use    Smoking status: Never    Smokeless tobacco: Never   Vaping Use    Vaping status: Never Used   Substance and Sexual Activity    Alcohol use: Yes     Comment: 2x a month    Drug use: No   Other Topics Concern    Caffeine Concern Yes     Comment: less than 1-2  cups daily    Exercise Yes              Review of Systems    Positive for stated Chief Complaint: Abdomen/Flank Pain and Difficulty Breathing    Other systems are as noted in HPI.  Constitutional and vital signs reviewed.      All other systems reviewed and negative except as noted above.    Physical Exam     ED Triage Vitals   BP 07/02/24 1715 121/83   Pulse 07/02/24 1715 85   Resp 07/02/24 1715 18   Temp 07/02/24 1947 98.5 °F (36.9 °C)   Temp src 07/02/24 1947 Oral   SpO2 07/02/24 1715 99 %   O2 Device 07/02/24 1715 None (Room air)       Current Vitals:   Vital Signs  BP: 118/69  Pulse: 78  Resp: 18  Temp: 98.5 °F (36.9 °C)  Temp src: Oral    Oxygen Therapy  SpO2: 98 %  O2 Device: None (Room air)            Physical Exam  Vitals and nursing note reviewed.   Constitutional:       General: He is not in acute distress.  HENT:      Head: Normocephalic and atraumatic.      Nose: Nose normal.      Mouth/Throat:      Mouth: Mucous membranes are moist.   Eyes:      Extraocular Movements: Extraocular movements intact.      Pupils: Pupils are equal, round, and reactive to light.   Cardiovascular:      Rate and Rhythm: Normal rate and regular rhythm.      Pulses: Normal pulses.   Pulmonary:      Effort: Pulmonary effort is normal. No respiratory distress.      Breath sounds: No wheezing.   Abdominal:      General: There is no distension.      Palpations: Abdomen is soft.          Comments: Mild LUQ TTP   Genitourinary:     Comments: Mild L CVT  Musculoskeletal:         General: No swelling. Normal range of motion.      Cervical back: Normal range of motion.        Back:    Skin:     General: Skin is warm and dry.      Capillary Refill: Capillary refill takes less than 2 seconds.   Neurological:      General: No focal deficit present.      Mental Status: He is alert.   Psychiatric:         Mood and Affect: Mood normal.               ED Course     Labs Reviewed   COMP METABOLIC PANEL (14) - Abnormal; Notable for the following  components:       Result Value    Glucose 120 (*)     Sodium 135 (*)     Creatinine 1.32 (*)     A/G Ratio 0.9 (*)     All other components within normal limits   URINALYSIS, ROUTINE - Abnormal; Notable for the following components:    Ketones Urine Trace (*)     All other components within normal limits   LIPASE - Normal   RAPID SARS-COV-2 BY PCR - Normal   POCT FLU TEST - Normal    Narrative:     This assay is a rapid molecular in vitro test utilizing nucleic acid amplification of influenza A and B viral RNA.   CBC WITH DIFFERENTIAL WITH PLATELET    Narrative:     The following orders were created for panel order CBC With Differential With Platelet.  Procedure                               Abnormality         Status                     ---------                               -----------         ------                     CBC W/ DIFFERENTIAL[[id number]]                              Final result                 Please view results for these tests on the individual orders.   CBC W/ DIFFERENTIAL                      [facility]      Patient is a 59-year-old male with a history of A-fib not on anticoagulation, hypertension, Sjogren's, anxiety presents the ED for evaluation of left flank pain starting around 2 PM while patient was at rest.  Patient is afebrile, hemodynamically stable and satting well on room air.  On exam patient has left CVT, mild left upper quadrant tenderness.  Initial differential diagnosis includes but limited to kidney stone, pyelo/UTI, splenic infarct, musculoskeletal pain. Low suspicion for vascular patholgoy such as aortic dissection given normal pulses, no CP, normal BP, no signicant RF. Will obtain labs, UA, CT abd/pelvis. Will give IVF, morphine.     ED Course:   Labs reviewed. No leukocytosis, nromal hgb. CMP unremarkable. Lipase wnl. Covid neg. UA neg for UTI or hematuria. CT abd/pelvis showed possible multifocal pneumonia or pneumonitis in addition to nonspecific enteritis. CXR was ordered which  also showed possible viral peribronchial cuffing or pneumonitis. No focal consolidation. Pt reports mild chronic dry cough from Sjogren's.     Given imaging findings, will treat for pneumonia with course of PO antibiotics. In regards to pt's pain, will plan for supportive care of possible muscle spasm with NSAIDs and valium. Patient is stable for discharge home with close PCP f/u. Discussed strict return precautions and supportive care. Patient verbalized understanding and agreement of plan.            Medical Decision Making      Disposition and Plan     Clinical Impression:  1. Left flank pain    2. Pneumonia due to infectious organism, unspecified laterality, unspecified part of lung         Disposition:  Discharge  7/2/2024  9:19 pm    Follow-up:  Goyo Santillan MD  [id number]  [facility] [address]  [phone]    Follow up in 1 day(s)            Medications Prescribed:  Discharge Medication List as of 7/2/2024  9:27 PM        START taking these medications    Details   amoxicillin clavulanate 875-125 MG Oral Tab Take 1 tablet by mouth 2 (two) times daily for 5 days., Normal, Disp-10 tablet, R-0      azithromycin (ZITHROMAX Z-MARK) 250 MG Oral Tab 500 mg once followed by 250 mg daily x 4 days, Normal, Disp-6 tablet, R-0      naproxen 500 MG Oral Tab Take 1 tablet (500 mg total) by mouth 2 (two) times daily as needed., Normal, Disp-14 tablet, R-0      lidocaine 5 % External Patch Place 1 patch onto the skin daily for 10 days., Normal, Disp-10 patch, R-0      diazePAM 2 MG Oral Tab Take 1 tablet (2 mg total) by mouth 3 (three) times daily as needed for Anxiety., Normal, Disp-8 tablet, R-0               Diamante Merchant DO  Emergency Medicine Physician

## 2024-07-03 NOTE — DISCHARGE INSTRUCTIONS
You were seen in the emergency department.  Your workup showed that you possibly have a pneumonia.  We suspect that your pain could be muscle pain but please take the antibiotics as prescribed.    For the pain you can take naproxen twice daily.  Apply ice or heat to the affected area.  You can also take a muscle relaxer as needed for severe pain.  Return to the emergency department develop fevers, worsening pain, vomiting, chest pain or difficulty breathing, or any other new concerns or worsening symptoms.  Please follow closely with your primary care physician for reevaluation.

## 2024-08-28 RX ORDER — NEBIVOLOL 2.5 MG/1
2.5 TABLET ORAL DAILY
Qty: 90 TABLET | Refills: 2 | Status: SHIPPED | OUTPATIENT
Start: 2024-08-28

## 2024-10-02 ENCOUNTER — TELEPHONE (OUTPATIENT)
Facility: CLINIC | Age: 59
End: 2024-10-02

## 2024-10-04 ENCOUNTER — OFFICE VISIT (OUTPATIENT)
Dept: RHEUMATOLOGY | Facility: CLINIC | Age: 59
End: 2024-10-04
Payer: COMMERCIAL

## 2024-10-04 VITALS
OXYGEN SATURATION: 97 % | HEIGHT: 69 IN | DIASTOLIC BLOOD PRESSURE: 64 MMHG | HEART RATE: 72 BPM | SYSTOLIC BLOOD PRESSURE: 100 MMHG | WEIGHT: 156.81 LBS | RESPIRATION RATE: 16 BRPM | TEMPERATURE: 98 F | BODY MASS INDEX: 23.23 KG/M2

## 2024-10-04 DIAGNOSIS — M05.9 RHEUMATOID ARTHRITIS WITH POSITIVE RHEUMATOID FACTOR, INVOLVING UNSPECIFIED SITE (HCC): Primary | ICD-10-CM

## 2024-10-04 DIAGNOSIS — Z51.81 THERAPEUTIC DRUG MONITORING: ICD-10-CM

## 2024-10-04 DIAGNOSIS — M35.00 SJOGREN'S SYNDROME WITHOUT EXTRAGLANDULAR INVOLVEMENT (HCC): ICD-10-CM

## 2024-10-04 DIAGNOSIS — R79.0 LOW FERRITIN: ICD-10-CM

## 2024-10-04 DIAGNOSIS — K11.7 XEROSTOMIA: ICD-10-CM

## 2024-10-04 DIAGNOSIS — E53.8 LOW SERUM VITAMIN B12: ICD-10-CM

## 2024-10-04 PROCEDURE — 3078F DIAST BP <80 MM HG: CPT | Performed by: INTERNAL MEDICINE

## 2024-10-04 PROCEDURE — 96372 THER/PROPH/DIAG INJ SC/IM: CPT | Performed by: INTERNAL MEDICINE

## 2024-10-04 PROCEDURE — 3074F SYST BP LT 130 MM HG: CPT | Performed by: INTERNAL MEDICINE

## 2024-10-04 PROCEDURE — 3008F BODY MASS INDEX DOCD: CPT | Performed by: INTERNAL MEDICINE

## 2024-10-04 PROCEDURE — 99214 OFFICE O/P EST MOD 30 MIN: CPT | Performed by: INTERNAL MEDICINE

## 2024-10-04 RX ORDER — METHYLPREDNISOLONE ACETATE 80 MG/ML
80 INJECTION, SUSPENSION INTRA-ARTICULAR; INTRALESIONAL; INTRAMUSCULAR; SOFT TISSUE ONCE
Status: COMPLETED | OUTPATIENT
Start: 2024-10-04 | End: 2024-10-04

## 2024-10-04 RX ADMIN — METHYLPREDNISOLONE ACETATE 80 MG: 80 INJECTION, SUSPENSION INTRA-ARTICULAR; INTRALESIONAL; INTRAMUSCULAR; SOFT TISSUE at 11:26:00

## 2024-10-04 NOTE — PATIENT INSTRUCTIONS
Sjogren's Plan:    Dry eyes, try OTC eye drops: Systane, Refresh or Blink. Preservative-free (individually packaged) are the best as preservatives can dry the eyes out.     Dry Mouth Remedies: https://www.sjogrens.org/member-community/product-directory/products-for-dry-mouth   Highlights:   Dry Mouth Lozenges/tabs: ACT Dry mouth lozenges, XyliMelts,TheraMints, MIGHTEAFLOW Dry Mouth Chewing Gum/Lozenges  Dry Mouth Spray/Gels: Gels may be very useful at night to prevent nighttime \"cottonmouth\". 3M™ Xerostomia Relief Spray, Biotène® Moisturizing Mouth Spray/Gel/Liquid  Mouthwash/Toothpaste: Biotene, ACT products    Consider joining Sjogren's Foundation for brochures and resource guides.  There is an online community as well to discuss with others with Sjogren's. https://www.sjogrens.org/member-community    Another great site for product recommendation from [facility] Sjogren's Website: https://patient.[facility].org/education/tips-for-managing-sjogrens-syndrome?pk_vid=[email or web address]

## 2024-10-04 NOTE — PROGRESS NOTES
Rheumatology f/u Patient Note  =====================================================================================================    Chief Complaint   Patient presents with    Follow - Up     LOV 1/2/2024  Rapid 3 score is a 2.7  Patient is having pain in his fingers on both hands, low back pain, and bilateral knee pain. He states he stopped the hydroxychloroquine because it was not helping.        PCP  Goyo Santillan MD  Fax: [phone]  Phone: [phone]    =====================================================================================================  HPI    Horace Hoang is a 59 year old male     7 years ago: Diagnosed with Sjogren's.  He used to see Dr. Roldan for Sjogren's.  Tried hydroxychloroquine which did not help.  +dry mouth: very bad. Pilocarpine 5 mg BID didn't help. Not too many carries, but has had +root canals. Biotene and ACT didn't help. Gum helping a bit; no sugar free.   Dry eye: not as bad as mouth.   More fatigue, which is bothersome.   More \"heat rashes\", but otherwise no rashes or photosensitive rashes.   Hx Left parotid MALToma. On  08/18/2016 he underwent left superficial parotidectomy. 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 Staging studies were negative for definitive evidence of systemic disease. No chemo or XRT. Seeing Dr. Cantor in oncology. No relapse noted.   Denies any new areas of joint pain/swelling  Denies any skin rashes.   Denies any new lymphadenopathy  Denies any new peripheral neuropathy  Denies any photosensitivity  Denies any significant fatigue or night sweats or unexpected weight loss.   Denies current alopecia, malar rash, photosensitivity rash, discoid lesions, oral ulcers, pleuritic chest pain, arthritis, seizures/psychosis, Raynaud's, dry eyes/mouth, or blood  clots.  ==============================================================================================================  Visit: 11/09/21  Fingers are more painful. Hard to get on rings. We are achy at the end of the day.  Back pain is off and on.   More of a dry cough. No SOB  Dry mouth is very bad. Was unable to  cevimeline.  Denies any skin rashes.   Denies any new lymphadenopathy  Denies any new peripheral neuropathy  Denies any photosensitivity  Denies any significant fatigue or night sweats or unexpected weight loss.   ==============================================================================================================  Visit: 01/13/22  methotrexate gave abdominal pain and gas.   Cough is better with evoxac  Prednisone was no good for him. Gave him insomnia.   Folic acid was giving the patient gas.   methotrexate was giving gas.  Split the dose methotrexate.  Change folic acid for folinic acid/leucovorin.  To once a week, this didn't help.   ==============================================================================================================  Visit: 01/02/24  -Lost to follow-up for the last 2 years.  Hand pain/swelling improved.  More myalgia in the shoulders and hips.  -Stop taking cevimeline 30 mg 3 times daily. No improvement.  However his xerostomia continues to be an issue.  Seeing dentist, significant mount of cavities due to dry mouth.  Drinking water constantly.-sleep is not good due to dry mouth.   Methotrexate subcutaneous not approved. Leflunomide caused abd pain.   -Overdue for follow-up with Dr. Sanchez for his lymphoma  Denies any new areas of joint pain/swelling  Denies any skin rashes.   Denies any new lymphadenopathy  Denies any new peripheral neuropathy  Denies any photosensitivity  Denies any significant fatigue or night sweats or unexpected weight loss.    ==============================================================================================================  Today's Visit: 10/04/24    Had a pain flareup of arthritis in March 2024.  Obtained Depo-Medrol 80 mg intramuscular that lasted up until recent.  Affect for 6 months or so.  -Restarting hydroxychloroquine did not help  -Retrial of cevimeline did not help at all  -He continues to just drink copious amounts of water for the dry mouth  -Sees dentist every 3 months.  Try to brush more and floss consistently and he has not had any cavities in the last several months or so  -Does not need to see oncology for several years now.    Denies any new areas of joint pain/swelling  Denies any skin rashes.   Denies any new lymphadenopathy  Denies any new peripheral neuropathy  Denies any photosensitivity  Denies any significant fatigue or night sweats or unexpected weight loss.     5 point ROS negative except noted above    Medications:  Outpatient Medications Marked as Taking for the 10/4/24 encounter (Office Visit) with Raheem Palafox MD   Medication Sig Dispense Refill    NEBIVOLOL 2.5 MG Oral Tab TAKE 1 TABLET(2.5 MG) BY MOUTH DAILY 90 tablet 2    escitalopram 20 MG Oral Tab Take 1 tablet (20 mg total) by mouth daily. 90 tablet 2    Multiple Vitamins-Minerals (MULTIVITAMIN ADULT OR) Take 1 tablet by mouth daily.      Vitamin D3 2000 UNITS Oral Cap Take 1 capsule (2,000 Units total) by mouth daily.       Modified Medications    No medications on file     Medications Discontinued During This Encounter   Medication Reason    diazePAM 2 MG Oral Tab     Hydrocod Moi-Chlorphe Moi ER 10-8 MG/5ML Oral Suspension Extended Release     hydroxychloroquine 200 MG Oral Tab      Social History:  Social History     Socioeconomic History    Marital status:    Tobacco Use    Smoking status: Never    Smokeless tobacco: Never   Vaping Use    Vaping status: Never Used   Substance and Sexual Activity    Alcohol use: Yes      Comment: 2x a month    Drug use: No   Other Topics Concern    Caffeine Concern Yes     Comment: less than 1-2 cups daily    Exercise Yes     ?  Allergies:  Allergies   Allergen Reactions    Bactrim HIVES     TABS    Sulfamethoxazole-Trimethoprim HIVES    Alc-Benzyl Alc-Sulfamethoxazole-Trimethoprim HIVES    Sulfamethoxazole W/Trimethoprim     Wellbutrin [Bupropion Hcl] DIZZINESS         Objective    Vitals:    10/04/24 1059   BP: 100/64   Pulse: 72   Resp: 16   Temp: 97.9 °F (36.6 °C)   SpO2: 97%   Weight: 156 lb 12.8 oz (71.1 kg)   Height: 5' 9\" (1.753 m)     GEN: NAD, well-nourished.   HEENT: Head: NCAT. Face: No lesions. Eyes: Conjunctiva clear. Sclera are anicteric. PERRLA. EOMs are full.-No parotid or submandibular gland swelling    CV: RRR, no mrg, S1/S2  PULM:  CTAB, easy effort  Extremities: No cyanosis, edema or deformities.   Neurologic: Strength, CN2-12 grossly intact   Psych: normal affect.   Skin: No lesions or rashes.  MSK: 28 joint count performed. No evidence of synovitis in mcp, pip, dip, wrist, elbows, shoulders, hips, knees, ankles, mtp unless otherwise noted. Full ROM of elbows, wrists, knees.     PtGA: 3.5  SJ: 2 (LPIPs)  TJ: >15  MDGA: 5    Labs:    Lab Results   Component Value Date    B12 646 02/17/2014    TSH 1.790 06/13/2024       Lab Results   Component Value Date    WBC 6.4 07/02/2024    RBC 4.68 07/02/2024    HGB 14.6 07/02/2024    HCT 40.6 07/02/2024    .0 07/02/2024    MCV 86.8 07/02/2024    MCH 31.2 07/02/2024    MCHC 36.0 07/02/2024    RDW 11.7 07/02/2024    NEPRELIM 4.36 07/02/2024    NEPERCENT 68.5 07/02/2024    LYPERCENT 18.2 07/02/2024    MOPERCENT 9.9 07/02/2024    EOPERCENT 2.8 07/02/2024    BAPERCENT 0.3 07/02/2024    NE 4.36 07/02/2024    LYMABS 1.16 07/02/2024    MOABSO 0.63 07/02/2024    EOABSO 0.18 07/02/2024    BAABSO 0.02 07/02/2024     Lab Results   Component Value Date     (H) 07/02/2024    BUN 21 07/02/2024    BUNCREA 13.3 05/24/2021    CREATSERUM 1.32  (H) 07/02/2024    ANIONGAP 5 07/02/2024    GFR 80 06/19/2017    GFRNAA 76 04/26/2022    GFRAA 88 04/26/2022    CA 9.7 07/02/2024    OSMOCALC 284 07/02/2024    ALKPHO 108 07/02/2024    AST 35 07/02/2024    ALT 35 07/02/2024    BILT 0.6 07/02/2024    TP 8.0 07/02/2024    ALB 3.8 07/02/2024    GLOBULIN 4.2 07/02/2024    AGRATIO 1.6 02/21/2013     (L) 07/02/2024    K 3.8 07/02/2024     07/02/2024    CO2 26.0 07/02/2024         Lab Results   Component Value Date    HIRAM POSITIVE (H) 02/17/2014    ANAS Positive (A) 12/01/2015     Lab Results   Component Value Date     (H) 05/24/2021    ANTISSA 954 (H) 06/10/2014     (H) 05/24/2021    ANTISSB 1,258 (H) 06/10/2014     Lab Results   Component Value Date    DSDNA <100 12/01/2015    ANTIDSDNA 8 06/10/2014    SMUD <100 12/01/2015    ANTISM 35 06/10/2014    RNP <100 12/01/2015     Lab Results   Component Value Date    SCL70 <100 12/01/2015    CUBCTHM49 6 06/10/2014     Lab Results   Component Value Date    C3 115.0 12/29/2023    C4 20.0 12/29/2023     Lab Results   Component Value Date    DRVVT Negative 10/12/2021    [id number] 1.9 10/12/2021    [id number] <2.9 10/12/2021     Lab Results   Component Value Date    CARDIOLIPIGG 3.0 10/12/2021    CARDIOLIPIGM <0.8 10/12/2021         Additional Labs:    5/2021      RF negative  Lyme neg    2016  RF IgA 297  RF IgG 19  RF IgM 137      Radiology:  CT abd/pelvis [facility] in [facility]    Radiology review:        =====================================================================================================  Assessment and Plan    Assessment:  1. Rheumatoid arthritis with positive rheumatoid factor, involving unspecified site (HCC)    2. Xerostomia    3. Sjogren's syndrome without extraglandular involvement (HCC)    4. Low ferritin    5. Low serum vitamin B12    6. Therapeutic drug monitoring      #Seropositive Sjogren's syndrome diagnosed in 2014.    --Clinical manifestations have included dry  eye, dry mouth, left parotid MALToma s/p resection in 8/2016 (no systemic disease, no need for chemoRT, no recurrences), inflammatory arthritis, fatigue  --Serologies: positive HIRAM, positive SSA, positive SSB, positive rheumatoid factor.  --Prior medications: Leflunomide (diarrhea/abdominal pain), methotrexate (abdominal pain), hydroxychloroquine (no efficacy)  --CDAI 26.5 indicating severely active disease in terms of Sjogren's arthritis.  He has had long intercritical periods in terms of inflammatory arthritis sometimes lasting a few years.  Most recently had a flareup of his arthritis in March 2024 that required treatment with a Depo-Medrol 80 mg intramuscular injection.  Effect of the injection lasted for about 6 months or so  --At this juncture given the failure of multiple medications and biologic therapy would pose more risks than benefits in this patient, intermittent and as needed Depo-Medrol intramuscular injections may be the best path forward.    #Xerostomia: Failed pilocarpine/cevimeline in the past  -Multiple and worsening dental caries  -Chronic cough waking patient up at night due to severity of sicca    High risk medication labs including CMP and CBC w/ diff reviewed from 1/2024. Results are stable. 10/2021: HBsAg, HBcAB total negative, HCV neg, IGRA neg    Plan:  -The annual Sjogren's monitoring blood work  -Depo-Medrol 80 mg intramuscular today for active synoviits from Sjogren's  -Continue dental hygiene, every 3-month dental visits  -Patient seeing optometrist soon for dry eye.  Change artificial tears to preservative-free version  -See Dr. Daniel sanders for lymphoma monitoring    Sjogren's Plan:    Dry eyes, try OTC eye drops: Systane, Refresh or Blink. Preservative-free (individually packaged) are the best as preservatives can dry the eyes out.     Dry Mouth Remedies: https://[email or web address]-[email or web address]/products-[email or web address]-dry-mouth   Highlights:   Dry Mouth Lozenges/tabs: ACT  Dry mouth lozenges, XyliMelts,TheraMints, MIGHTEAFLOW Dry Mouth Chewing Gum/Lozenges  Dry Mouth Spray/Gels: Gels may be very useful at night to prevent nighttime \"cottonmouth\". 3M™ Xerostomia Relief Spray, Biotène® Moisturizing Mouth Spray/Gel/Liquid  Mouthwash/Toothpaste: Biotene, ACT products    Consider joining Sjogren's Foundation for brochures and resource guides.  There is an online community as well to discuss with others with Sjogren's. [email or web address]    Another great site for product recommendation from [facility] Sjogren's Website: [email or web address]-[email or web address]-clinic/tips-[email or web address]-managing-[email or web address]-[email or web address]/07343      [facility] summer 2025    Diagnoses and all orders for this visit:    Rheumatoid arthritis with positive rheumatoid factor, involving unspecified site (HCC)  -     methylPREDNISolone acetate (DEPO-medrol) 80 MG/ML injection 80 mg  -     Comp Metabolic Panel (14); Future  -     CBC With Differential With Platelet; Future  -     Monoclonal Protein Study; Future  -     Urinalysis with Culture Reflex; Future  -     Creatinine, Urine, Random; Future  -     Protein,Total,Urine, Random; Future  -     C-Reactive Protein; Future  -     Sed Rate, Westergren (Automated); Future  -     Complement C3, Serum; Future  -     Complement C4, Serum; Future    Xerostomia  -     methylPREDNISolone acetate (DEPO-medrol) 80 MG/ML injection 80 mg  -     Comp Metabolic Panel (14); Future  -     CBC With Differential With Platelet; Future  -     Monoclonal Protein Study; Future  -     Urinalysis with Culture Reflex; Future  -     Creatinine, Urine, Random; Future  -     Protein,Total,Urine, Random; Future  -     C-Reactive Protein; Future  -     Sed Rate, Westergren (Automated); Future  -     Complement C3, Serum; Future  -     Complement C4, Serum; Future    Sjogren's syndrome without extraglandular involvement (HCC)  -     methylPREDNISolone acetate (DEPO-medrol) 80 MG/ML injection 80  mg  -     Comp Metabolic Panel (14); Future  -     CBC With Differential With Platelet; Future  -     Monoclonal Protein Study; Future  -     Urinalysis with Culture Reflex; Future  -     Creatinine, Urine, Random; Future  -     Protein,Total,Urine, Random; Future  -     C-Reactive Protein; Future  -     Sed Rate, Westergren (Automated); Future  -     Complement C3, Serum; Future  -     Complement C4, Serum; Future    Low ferritin    Low serum vitamin B12    Therapeutic drug monitoring        Return in about 10 months (around 8/4/2025).      The above plan of care, diagnosis, orders, and follow-up were discussed with the patient. Questions related to this recommended plan of care were answered.    Thank you for referring this delightful patient to me. Please feel free to contact me with any questions.     This report was performed utilizing speech recognition software technology. Despite proofreading, speech recognition errors could escape detection. If a word or phrase is confusing or out of context, please do not hesitate to call for   clarification.       Kind regards      Raheem Palafox MD  [facility]
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
## 2024-11-27 ENCOUNTER — OFFICE VISIT (OUTPATIENT)
Dept: FAMILY MEDICINE CLINIC | Facility: CLINIC | Age: 59
End: 2024-11-27
Payer: COMMERCIAL

## 2024-11-27 VITALS
SYSTOLIC BLOOD PRESSURE: 110 MMHG | DIASTOLIC BLOOD PRESSURE: 70 MMHG | TEMPERATURE: 98 F | BODY MASS INDEX: 23.85 KG/M2 | RESPIRATION RATE: 17 BRPM | WEIGHT: 161 LBS | HEIGHT: 69 IN | HEART RATE: 75 BPM | OXYGEN SATURATION: 100 %

## 2024-11-27 DIAGNOSIS — J01.00 ACUTE NON-RECURRENT MAXILLARY SINUSITIS: Primary | ICD-10-CM

## 2024-11-27 PROCEDURE — 3074F SYST BP LT 130 MM HG: CPT

## 2024-11-27 PROCEDURE — 3008F BODY MASS INDEX DOCD: CPT

## 2024-11-27 PROCEDURE — 3078F DIAST BP <80 MM HG: CPT

## 2024-11-27 PROCEDURE — 99213 OFFICE O/P EST LOW 20 MIN: CPT

## 2024-11-27 NOTE — PATIENT INSTRUCTIONS
Take antibiotic  Try saline nasal spray over the counter and mucinex sinus and nasal (orange label) to help break up any mucus

## 2024-11-27 NOTE — PROGRESS NOTES
Chief Complaint   Patient presents with    Sinusitis     X1 week, nasal congestion, sinus pressure, worst in the morning upon awakening, no fever     HPI:   Horace Hoang is a 59 year old male who presents with worsening sinus pressure and congestion for 7 days.  Patient reports mild facial pressure and pain for the past 7 days.  Patient reports no sore throat and no ear pressure.  Patient reports no cough.  Patient has tried OTC Nightquil with minimal relief.  Patient reports no fevers and reports no other associated symptoms.   Denies post nasal drip.  Reports having congestion and pressure under his eyes mainly but also around his head.    Current Outpatient Medications   Medication Sig Dispense Refill    amoxicillin clavulanate 875-125 MG Oral Tab Take 1 tablet by mouth 2 (two) times daily for 10 days. 20 tablet 0    NEBIVOLOL 2.5 MG Oral Tab TAKE 1 TABLET(2.5 MG) BY MOUTH DAILY 90 tablet 2    escitalopram 20 MG Oral Tab Take 1 tablet (20 mg total) by mouth daily. 90 tablet 2    Multiple Vitamins-Minerals (MULTIVITAMIN ADULT OR) Take 1 tablet by mouth daily.      Vitamin D3 2000 UNITS Oral Cap Take 1 capsule (2,000 Units total) by mouth daily.        Past Medical History:    Arrhythmia    tachycardia    Atrial fibrillation (HCC)    BACK PAIN    DEPRESSION    Extranodal marginal zone B-cell lymphoma of mucosa-associated lymphoid tissue (MALT)    Hypertension    Sjogren's syndrome (HCC)    Visual impairment    glasses for distance      Past Surgical History:   Procedure Laterality Date    Other surgical history      right shoulder arthroscopy    Other surgical history      right knee arthroscopy    Tympanoplas/mastoidec,intact wall Right 2015    Procedure: TYMPANOMASTOIDECTOMY W/O OSSICULAR CHAIN RECONSTRUCTION;  Surgeon: Julio Bird MD;  Location: [facility], [facility]      Family History   Problem Relation Age of Onset    Cancer Mother         breast cancer-  age 52    Breast Cancer  Mother     ADHD Son     Bipolar Disorder Son     Renal Disease Brother         x2      Social History     Socioeconomic History    Marital status:    Tobacco Use    Smoking status: Never    Smokeless tobacco: Never   Vaping Use    Vaping status: Never Used   Substance and Sexual Activity    Alcohol use: Yes     Comment: 2x a month    Drug use: No   Other Topics Concern    Caffeine Concern Yes     Comment: less than 1-2 cups daily    Exercise Yes         REVIEW OF SYSTEMS:   GENERAL: feels well otherwise  SKIN: no rashes  EYES:denies blurred vision or double vision  HEENT: worsening sinus pressure with facial pressure and pain.   LUNGS: denies shortness of breath with exertion  CARDIOVASCULAR: denies chest pain on exertion  GI: no nausea or abdominal pain  NEURO: denies headaches    EXAM:   /70   Pulse 75   Temp 97.9 °F (36.6 °C)   Resp 17   Ht 5' 9\" (1.753 m)   Wt 161 lb (73 kg)   SpO2 100%   BMI 23.78 kg/m²   GENERAL: well developed, well nourished,in no apparent distress  SKIN: no rashes,no suspicious lesions  EYES:PERRLA, EOMI, conjunctiva are clear  HEENT: atraumatic, normocephalic,ears-normal. Patient has facial tenderness in maxillary and frontal sinuses. Slight rhinorrhea noted, voice sounds raspy/congested  NECK: supple,no adenopathy,no bruits  LUNGS: clear to auscultation, no wheezing or rhonchi.   CARDIO: RRR without murmur, S1 S2.     ASSESSMENT AND PLAN:   1. Acute non-recurrent maxillary sinusitis  Prescribed antibiotic for patient since his symptoms have been going on for 7 days and not getting better. I advised him to use OTC saline nasal spray as well as OTC Mucinex Sinus/Nasal to help break up any additional mucus in his sinus cavities.  - amoxicillin clavulanate 875-125 MG Oral Tab; Take 1 tablet by mouth 2 (two) times daily for 10 days.  Dispense: 20 tablet; Refill: 0    Orders Placed This Encounter    amoxicillin clavulanate 875-125 MG Oral Tab     Sig: Take 1 tablet by mouth  2 (two) times daily for 10 days.     Dispense:  20 tablet     Refill:  0      Patient/Caregiver Education: Patient/Caregiver Education: There are no barriers to learning. Medical education done.     Outcome: Horace Hoang verbalizes understanding, agrees with the plan, and had no other questions at the end of today's visit. Horace Hoang is informed to call with any questions, complications, allergies, or worsening or changing symptoms.  Horace Hoang  is to call with any side effects or complications from the treatments as a result of today.

## 2024-11-29 ENCOUNTER — TELEPHONE (OUTPATIENT)
Dept: FAMILY MEDICINE CLINIC | Facility: CLINIC | Age: 59
End: 2024-11-29

## 2024-11-29 DIAGNOSIS — J01.00 ACUTE NON-RECURRENT MAXILLARY SINUSITIS: Primary | ICD-10-CM

## 2024-11-29 RX ORDER — DOXYCYCLINE 100 MG/1
100 CAPSULE ORAL 2 TIMES DAILY
Qty: 10 CAPSULE | Refills: 0 | Status: SHIPPED | OUTPATIENT
Start: 2024-11-29

## 2024-11-29 NOTE — TELEPHONE ENCOUNTER
Patient called to report that the amoxicillin he was prescribed on 11/27/24 is not agreeing with him. He states he is getting an upset stomach.  I confirmed that he is eating prior to taking it.  Requesting the Z-pack    Luis F - patient is requesting a different antibiotic

## 2024-11-29 NOTE — TELEPHONE ENCOUNTER
Received message from nurse triage stating that Horace is having upset stomach while taking amoxicillin.  He was prescribed  amoxicillin clavulanate 875-124 bid for 10 days 11/27/2024 for sinus infection.    Due to least GI side effects that he is experiencing, I recommend he discontinues taking the amoxicillin clavulanate.  I wrote a prescription for doxycycline 100 mg twice daily for 5 days that he can take instead.    I highly recommend he increases his prebiotic load by eating plenty of yogurt since being on multiple antibiotics in the short timeframe can increase his risk of future C. difficile.

## 2025-02-20 ENCOUNTER — NURSE ONLY (OUTPATIENT)
Dept: RHEUMATOLOGY | Facility: CLINIC | Age: 60
End: 2025-02-20
Payer: COMMERCIAL

## 2025-02-20 VITALS — DIASTOLIC BLOOD PRESSURE: 80 MMHG | SYSTOLIC BLOOD PRESSURE: 120 MMHG

## 2025-02-20 DIAGNOSIS — M35.00 SJOGREN'S SYNDROME WITHOUT EXTRAGLANDULAR INVOLVEMENT (HCC): ICD-10-CM

## 2025-02-20 DIAGNOSIS — M05.9 RHEUMATOID ARTHRITIS WITH POSITIVE RHEUMATOID FACTOR, INVOLVING UNSPECIFIED SITE (HCC): Primary | ICD-10-CM

## 2025-02-20 PROCEDURE — 3074F SYST BP LT 130 MM HG: CPT | Performed by: INTERNAL MEDICINE

## 2025-02-20 PROCEDURE — 96372 THER/PROPH/DIAG INJ SC/IM: CPT | Performed by: INTERNAL MEDICINE

## 2025-02-20 PROCEDURE — 3079F DIAST BP 80-89 MM HG: CPT | Performed by: INTERNAL MEDICINE

## 2025-02-20 RX ORDER — METHYLPREDNISOLONE ACETATE 80 MG/ML
80 INJECTION, SUSPENSION INTRA-ARTICULAR; INTRALESIONAL; INTRAMUSCULAR; SOFT TISSUE ONCE
Status: COMPLETED | OUTPATIENT
Start: 2025-02-20 | End: 2025-02-20

## 2025-02-20 RX ADMIN — METHYLPREDNISOLONE ACETATE 80 MG: 80 INJECTION, SUSPENSION INTRA-ARTICULAR; INTRALESIONAL; INTRAMUSCULAR; SOFT TISSUE at 11:30:00

## 2025-02-20 NOTE — PROGRESS NOTES
Pt here for Depo-medrol 80mg/ml injection. Administered to right ventro-gluteal without incidence. See MAR.     /80

## 2025-04-01 NOTE — PROGRESS NOTES
Horace Hoang verbally consents to a Virtual/Telephone Check-In service on 04/01/25.    HPI:   Patient is presenting via Virtual visit:iinsomnia        HPI:  Presenting for follow up, history of adjustment insomnia, was previously on klonopin prn for acute anxiety, doesn't required anxiety rx as anxiety is under better control.   Has s/s of more adjustment insomnia, hard time falling and staying asleep.   Denies any other complaints at this time.     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 30 tablet 2                REVIEW OF SYSTEMS:   Patient denies shortness of breath, denies chest pain and denies any recent fevers or chills.    Patient reports no urinary complaints and denies headaches or visual disturbances.   Patient denies any abdominal pain at this time. Patient has no new skin lesions.  Patient reports no acute back pain and reports no dizziness or headaches.   Patient reports no visual disturbances and reports hearing has been about the same.   Patient reports no recent injury or trauma.             EXAM:    /80   Pulse 72   Wt 270 lb (122.5 kg)   BMI 38.19 kg/m²  Estimated body mass index is 38.19 kg/m² as calculated from the following:    Height as of 6/16/23: 5' 10.5\" (1.791 m).    Weight as of this  encounter: 270 lb (122.5 kg).  Physical Exam  Constitutional:       General: Patient is not in acute distress.  Pulmonary:      Effort: Pulmonary effort is normal. No respiratory distress.   Neurological:      Mental Status: Patient is alert and oriented to person, place, and time. Mental status is at baseline.      Motor: No weakness.      Coordination: Coordination normal.   Psychiatric:         Mood and Affect: Mood normal.         Behavior: Behavior normal.         Thought Content: Thought content normal.         Judgment: Judgment normal.   No acute anxiety or depression, adjustment insomnia was discussed.         ASSESSMENT AND PLAN:   1. Adjustment insomnia  -will do trial of ambien prn if no improvement, will consider xanax vs. trazodone  - zolpidem 10 MG Oral Tab; Take 1 tablet (10 mg total) by mouth nightly as needed for Sleep.  Dispense: 30 tablet; Refill: 2    2. Anxiety about health  -as above, much better anxiety control, no acute anxiety for now.   - zolpidem 10 MG Oral Tab; Take 1 tablet (10 mg total) by mouth nightly as needed for Sleep.  Dispense: 30 tablet; Refill: 2     Additionally, every conscious effort was taken to allow for sufficient and adequate time.  This billing visit was spent on reviewing labs, medications, radiology tests and decision making.  Appropriate medical decision-making and tests are ordered as detailed in the plan of care below.    Phone Time Documentation via audio only:  Spent NA minutes with patient via audio, discussing health concerns, treatment plan, reviewed current and previous records, medications, previous labs and imaging, and reviewed most recent office note.     Video Time Documentation via audio and visual:  Spent 25 minutes with patient via audio and visual, discussing health concerns, treatment plan, reviewed current and previous records, medications, previous labs and imaging, and reviewed most recent office note.

## 2025-05-01 ENCOUNTER — TELEMEDICINE (OUTPATIENT)
Dept: RHEUMATOLOGY | Facility: CLINIC | Age: 60
End: 2025-05-01
Payer: COMMERCIAL

## 2025-05-01 DIAGNOSIS — M35.00 SJOGREN'S SYNDROME WITHOUT EXTRAGLANDULAR INVOLVEMENT (HCC): ICD-10-CM

## 2025-05-01 DIAGNOSIS — M79.10 MYALGIA: Primary | ICD-10-CM

## 2025-05-01 DIAGNOSIS — K11.7 XEROSTOMIA: ICD-10-CM

## 2025-05-01 DIAGNOSIS — Z51.81 THERAPEUTIC DRUG MONITORING: ICD-10-CM

## 2025-05-01 DIAGNOSIS — M05.9 RHEUMATOID ARTHRITIS WITH POSITIVE RHEUMATOID FACTOR, INVOLVING UNSPECIFIED SITE (HCC): ICD-10-CM

## 2025-05-01 DIAGNOSIS — E53.8 LOW SERUM VITAMIN B12: ICD-10-CM

## 2025-05-01 PROCEDURE — 98006 SYNCH AUDIO-VIDEO EST MOD 30: CPT | Performed by: INTERNAL MEDICINE

## 2025-05-01 RX ORDER — CYCLOBENZAPRINE HCL 10 MG
10 TABLET ORAL NIGHTLY PRN
Qty: 90 TABLET | Refills: 0 | Status: SHIPPED | OUTPATIENT
Start: 2025-05-01 | End: 2025-07-30

## 2025-05-01 NOTE — PROGRESS NOTES
Rheumatology f/u Patient Note  =====================================================================================================    Chief complaint: Sjogren's    No chief complaint on file.      PCP  Goyo Santillan MD  Fax: [phone]  Phone: [phone]    =====================================================================================================  HPI    Horace Hoang is a 60 year old male     7 years ago: Diagnosed with Sjogren's.  He used to see Dr. Roldan for Sjogren's.  Tried hydroxychloroquine which did not help.  +dry mouth: very bad. Pilocarpine 5 mg BID didn't help. Not too many carries, but has had +root canals. Biotene and ACT didn't help. Gum helping a bit; no sugar free.   Dry eye: not as bad as mouth.   More fatigue, which is bothersome.   More \"heat rashes\", but otherwise no rashes or photosensitive rashes.   Hx Left parotid MALToma. On  08/18/2016 he underwent left superficial parotidectomy. Pathology, reviewed at the [facility], showed extranodal marginal zone lymphoma of mucosa-associated lymphoid tissue (MALT). Staging studies were negative for definitive evidence of systemic disease. No chemo or XRT. Seeing Dr. Cantor in oncology. No relapse noted.   Denies any new areas of joint pain/swelling  Denies any skin rashes.   Denies any new lymphadenopathy  Denies any new peripheral neuropathy  Denies any photosensitivity  Denies any significant fatigue or night sweats or unexpected weight loss.   Denies current alopecia, malar rash, photosensitivity rash, discoid lesions, oral ulcers, pleuritic chest pain, arthritis, seizures/psychosis, Raynaud's, dry eyes/mouth, or blood clots.  ==============================================================================================================  Visit: 01/02/24  -Lost to follow-up for the last 2 years.  Hand pain/swelling improved.  More myalgia in the shoulders and hips.  -Stop taking cevimeline 30 mg 3 times daily. No  improvement.  However his xerostomia continues to be an issue.  Seeing dentist, significant mount of cavities due to dry mouth.  Drinking water constantly.-sleep is not good due to dry mouth.   Methotrexate subcutaneous not approved. Leflunomide caused abd pain.   -Overdue for follow-up with Dr. Sanchez for his lymphoma  Denies any new areas of joint pain/swelling  Denies any skin rashes.   Denies any new lymphadenopathy  Denies any new peripheral neuropathy  Denies any photosensitivity  Denies any significant fatigue or night sweats or unexpected weight loss.   ==============================================================================================================  Visit: 10/04/24  Had a pain flareup of arthritis in March 2024.  Obtained Depo-Medrol 80 mg intramuscular that lasted up until recent.  Affect for 6 months or so.  -Restarting hydroxychloroquine did not help  -Retrial of cevimeline did not help at all  -He continues to just drink copious amounts of water for the dry mouth  -Sees dentist every 3 months.  Try to brush more and floss consistently and he has not had any cavities in the last several months or so  -Does not need to see oncology for several years now.  Denies any new areas of joint pain/swelling  Denies any skin rashes.   Denies any new lymphadenopathy  Denies any new peripheral neuropathy  Denies any photosensitivity  Denies any significant fatigue or night sweats or unexpected weight loss.   ==============================================================================================================  Today's Visit: 05/01/25    Doing fairly well overall.  Received Depo-Medrol injection October 4, 2024 and February 20, 2025.  Joint pain is good.  Intramuscular steroid injections help for several months.  In the last month or so, has been having intermittent bilateral thigh cramping and low back pain a couple days out of the week after work can last for several hours.  No antecedent event  or trigger.  - Dry mouth continues to be managed by conservative intervention  - Feeling very fatigued    5 point ROS negative except noted above    Medications:  Outpatient Medications Marked as Taking for the 5/1/25 encounter (Telemedicine) with Raheem Palafox MD   Medication Sig Dispense Refill    cyclobenzaprine 10 MG Oral Tab Take 1 tablet (10 mg total) by mouth nightly as needed for Muscle spasms. Do not take if you take ambien 90 tablet 0     Modified Medications    No medications on file     There are no discontinued medications.    Social History:  Social History     Socioeconomic History    Marital status:    Tobacco Use    Smoking status: Never    Smokeless tobacco: Never   Vaping Use    Vaping status: Never Used   Substance and Sexual Activity    Alcohol use: Yes     Comment: 2x a month    Drug use: No   Other Topics Concern    Caffeine Concern Yes     Comment: less than 1-2 cups daily    Exercise Yes     ?  Allergies:  Allergies   Allergen Reactions    Bactrim HIVES     TABS    Sulfamethoxazole-Trimethoprim HIVES    Alc-Benzyl Alc-Sulfamethoxazole-Trimethoprim HIVES    Sulfamethoxazole W/Trimethoprim     Wellbutrin [Bupropion Hcl] DIZZINESS         Objective    There were no vitals filed for this visit.    GEN: NAD, well-nourished.   HEENT: Head: NCAT. Face: No lesions. Eyes: Conjunctiva clear.   PULM:  easy effort  Extremities: No cyanosis, edema or deformities.   Neurologic: Strength, CN2-12 grossly intact   Skin: No lesions or rashes.      Labs:    Lab Results   Component Value Date    B12 646 02/17/2014    TSH 1.790 06/13/2024       Lab Results   Component Value Date    WBC 6.4 07/02/2024    RBC 4.68 07/02/2024    HGB 14.6 07/02/2024    HCT 40.6 07/02/2024    .0 07/02/2024    MCV 86.8 07/02/2024    MCH 31.2 07/02/2024    MCHC 36.0 07/02/2024    RDW 11.7 07/02/2024    NEPRELIM 4.36 07/02/2024    NEPERCENT 68.5 07/02/2024    LYPERCENT 18.2 07/02/2024    MOPERCENT 9.9 07/02/2024     EOPERCENT 2.8 07/02/2024    BAPERCENT 0.3 07/02/2024    NE 4.36 07/02/2024    LYMABS 1.16 07/02/2024    MOABSO 0.63 07/02/2024    EOABSO 0.18 07/02/2024    BAABSO 0.02 07/02/2024     Lab Results   Component Value Date     (H) 07/02/2024    BUN 21 07/02/2024    BUNCREA 13.3 05/24/2021    CREATSERUM 1.32 (H) 07/02/2024    ANIONGAP 5 07/02/2024    GFR 80 06/19/2017    GFRNAA 76 04/26/2022    GFRAA 88 04/26/2022    CA 9.7 07/02/2024    OSMOCALC 284 07/02/2024    ALKPHO 108 07/02/2024    AST 35 07/02/2024    ALT 35 07/02/2024    BILT 0.6 07/02/2024    TP 8.0 07/02/2024    ALB 3.8 07/02/2024    GLOBULIN 4.2 07/02/2024    AGRATIO 1.6 02/21/2013     (L) 07/02/2024    K 3.8 07/02/2024     07/02/2024    CO2 26.0 07/02/2024         Lab Results   Component Value Date    HIRAM POSITIVE (H) 02/17/2014    ANAS Positive (A) 12/01/2015     Lab Results   Component Value Date     (H) 05/24/2021    ANTISSA 954 (H) 06/10/2014     (H) 05/24/2021    ANTISSB 1,258 (H) 06/10/2014     Lab Results   Component Value Date    DSDNA <100 12/01/2015    ANTIDSDNA 8 06/10/2014    SMUD <100 12/01/2015    ANTISM 35 06/10/2014    RNP <100 12/01/2015     Lab Results   Component Value Date    SCL70 <100 12/01/2015    CGFJGMA07 6 06/10/2014     Lab Results   Component Value Date    C3 115.0 12/29/2023    C4 20.0 12/29/2023     Lab Results   Component Value Date    DRVVT Negative 10/12/2021    L3ZC3IRLHO 1.9 10/12/2021    F6YO6DWZLU <2.9 10/12/2021     Lab Results   Component Value Date    CARDIOLIPIGG 3.0 10/12/2021    CARDIOLIPIGM <0.8 10/12/2021         Additional Labs:    5/2021      RF negative  Lyme neg    2016  RF IgA 297  RF IgG 19  RF IgM 137      Radiology:  CT abd/pelvis [facility] in [facility]    Radiology review:        =====================================================================================================  Assessment and Plan  Assessment:  1. Sjogren's syndrome without extraglandular  involvement (HCC)    2. Rheumatoid arthritis with positive rheumatoid factor, involving unspecified site (HCC)    3. Low serum vitamin B12    4. Myalgia    5. Therapeutic drug monitoring    6. Xerostomia      #Bilateral thigh myalgia  - since April 2025  - Coexisting intermittent low back pain raises possibility of radiculopathy  - Currently asymptomatic  - Occurs about twice a week for a few hours and resolves with conservative management.    #Seropositive Sjogren's syndrome diagnosed in 2014.    --Clinical manifestations have included dry eye, dry mouth, left parotid MALToma s/p resection in 8/2016 (no systemic disease, no need for chemoRT, no recurrences), inflammatory arthritis, fatigue  --Serologies: positive HIRAM, positive SSA, positive SSB, positive rheumatoid factor.  --Prior medications: Leflunomide (diarrhea/abdominal pain), methotrexate (abdominal pain), hydroxychloroquine (no efficacy)  --He has had long intercritical periods in terms of inflammatory arthritis sometimes lasting a few years.  Flares of inflammatory arthritis respond to as needed Depo-Medrol 80 mg intramuscular.  Effect of the injection lasted for about 4-6 months or so  --At this juncture given the failure of multiple medications and biologic therapy would pose more risks than benefits in this patient, intermittent and as needed Depo-Medrol intramuscular injections may be the best path forward.    #Xerostomia: Failed pilocarpine/cevimeline in the past  -Multiple and worsening dental caries  -Chronic cough waking patient up at night due to severity of sicca    High risk medication labs including CMP and CBC w/ diff reviewed from 7/2024. Results are stable. 10/2021: HBsAg, HBcAB total negative, HCV neg, IGRA neg    Plan:  - Patient will discuss with oncology regarding if there is any contraindication to NSAIDs which the patient was told to avoid given his history of MALToma.  I see no reason why he cannot take intermittent ibuprofen.  -  Trial cyclobenzaprine 10 mg nightly as needed for muscle spasms and poor sleep.  Do not take Ambien at the same time  - Myalgia/fatigue labs ordered.  If negative proceed with x-ray of the lumbar spine to evaluate for any lumbar DJD that could predispose to radiculopathy  -get annual Sjogren's monitoring blood work  -Depo-Medrol 80 mg intramuscular as needed if there is active synovitis from Sjogren's.  This appears to be well-controlled today.  -Continue dental hygiene, every 3-month dental visits  - Continue to follow-up closely with eye care provider for dry eye.  Change artificial tears to preservative-free version  -See Dr. Daniel sanders for lymphoma monitoring    -- for dry eyes, try OTC eye drops: Systane, Refresh or Blink  -- for dry mouth, try biotene products (toothpaste, mouthwash, oral spray or lozenges)  Rtc 6-9 months       Called patient via number listed in chart today    Original appnt date: Today's Visit: 05/01/25    This video telehealth visit (with [employer] ) was conducted in lieu of a previously scheduled clinic visit because of the COVID-19 pandemic. The patient or patient guardian verbally consented to virtual/remote treatment. All medical decision making was made in conjunction with the patient. This telehealth visit was initiated by the patient or patient guardian given the in-person appointment time as above who was located at [home]. The patient presented alone. Risks and benefits of therapy recommended, and potential side effects of all medications prescribed were discussed.  Patient was counseled on symptoms that would necessitate more emergency follow up.     The patient was within the state of [place] during our visit.     Patient understands and accepts financial responsibility for any deductible, coinsurance and/or co-pays associated with the service. The patient understands that the physical exam will be limited.    This telehealth visit was performed while I was physically  located in a   Medical office building in [place]. [address], [place] [address]        Diagnoses and all orders for this visit:    Sjogren's syndrome without extraglandular involvement (HCC)  -     Vitamin D; Future  -     TSH W Reflex To Free T4; Future  -     Iron And Tibc; Future  -     Ferritin; Future  -     B12 AND FOLATE; Future  -     CK (Creatine Kinase) (Not Creatinine); Future  -     Comp Metabolic Panel (14); Future  -     CBC With Differential With Platelet; Future  -     Monoclonal Protein Study; Future  -     Urinalysis with Culture Reflex; Future  -     Creatinine, Urine, Random; Future  -     Protein,Total,Urine, Random; Future  -     C-Reactive Protein; Future  -     Sed Rate, Westergren (Automated); Future  -     Complement C3, Serum; Future  -     Complement C4, Serum; Future  -     cyclobenzaprine 10 MG Oral Tab; Take 1 tablet (10 mg total) by mouth nightly as needed for Muscle spasms. Do not take if you take ambien  -     Phosphorus; Future  -     Magnesium; Future    Rheumatoid arthritis with positive rheumatoid factor, involving unspecified site (HCC)  -     Vitamin D; Future  -     TSH W Reflex To Free T4; Future  -     Iron And Tibc; Future  -     Ferritin; Future  -     B12 AND FOLATE; Future  -     CK (Creatine Kinase) (Not Creatinine); Future  -     Comp Metabolic Panel (14); Future  -     CBC With Differential With Platelet; Future  -     Monoclonal Protein Study; Future  -     Urinalysis with Culture Reflex; Future  -     Creatinine, Urine, Random; Future  -     Protein,Total,Urine, Random; Future  -     C-Reactive Protein; Future  -     Sed Rate, Westergren (Automated); Future  -     Complement C3, Serum; Future  -     Complement C4, Serum; Future  -     cyclobenzaprine 10 MG Oral Tab; Take 1 tablet (10 mg total) by mouth nightly as needed for Muscle spasms. Do not take if you take ambien  -     Phosphorus; Future  -     Magnesium; Future    Low serum vitamin B12  -      Vitamin D; Future  -     TSH W Reflex To Free T4; Future  -     Iron And Tibc; Future  -     Ferritin; Future  -     B12 AND FOLATE; Future  -     CK (Creatine Kinase) (Not Creatinine); Future  -     Comp Metabolic Panel (14); Future  -     CBC With Differential With Platelet; Future  -     Monoclonal Protein Study; Future  -     Urinalysis with Culture Reflex; Future  -     Creatinine, Urine, Random; Future  -     Protein,Total,Urine, Random; Future  -     C-Reactive Protein; Future  -     Sed Rate, Westergren (Automated); Future  -     Complement C3, Serum; Future  -     Complement C4, Serum; Future  -     cyclobenzaprine 10 MG Oral Tab; Take 1 tablet (10 mg total) by mouth nightly as needed for Muscle spasms. Do not take if you take ambien  -     Phosphorus; Future  -     Magnesium; Future    Myalgia  -     Vitamin D; Future  -     TSH W Reflex To Free T4; Future  -     Iron And Tibc; Future  -     Ferritin; Future  -     B12 AND FOLATE; Future  -     CK (Creatine Kinase) (Not Creatinine); Future  -     Comp Metabolic Panel (14); Future  -     CBC With Differential With Platelet; Future  -     Monoclonal Protein Study; Future  -     Urinalysis with Culture Reflex; Future  -     Creatinine, Urine, Random; Future  -     Protein,Total,Urine, Random; Future  -     C-Reactive Protein; Future  -     Sed Rate, Westergren (Automated); Future  -     Complement C3, Serum; Future  -     Complement C4, Serum; Future  -     cyclobenzaprine 10 MG Oral Tab; Take 1 tablet (10 mg total) by mouth nightly as needed for Muscle spasms. Do not take if you take ambien  -     Phosphorus; Future  -     Magnesium; Future    Therapeutic drug monitoring    Xerostomia          No follow-ups on file.      The above plan of care, diagnosis, orders, and follow-up were discussed with the patient. Questions related to this recommended plan of care were answered.    Thank you for referring this delightful patient to me. Please feel free to contact  me with any questions.     This report was performed utilizing speech recognition software technology. Despite proofreading, speech recognition errors could escape detection. If a word or phrase is confusing or out of context, please do not hesitate to call for   clarification.       Kind regards      Raheem Palafox MD  [facility]

## 2025-05-06 ENCOUNTER — LAB ENCOUNTER (OUTPATIENT)
Dept: LAB | Facility: HOSPITAL | Age: 60
End: 2025-05-06
Attending: INTERNAL MEDICINE
Payer: COMMERCIAL

## 2025-05-06 DIAGNOSIS — K11.7 XEROSTOMIA: ICD-10-CM

## 2025-05-06 DIAGNOSIS — M79.10 MYALGIA: ICD-10-CM

## 2025-05-06 DIAGNOSIS — M05.9 RHEUMATOID ARTHRITIS WITH POSITIVE RHEUMATOID FACTOR, INVOLVING UNSPECIFIED SITE (HCC): ICD-10-CM

## 2025-05-06 DIAGNOSIS — E53.8 LOW SERUM VITAMIN B12: ICD-10-CM

## 2025-05-06 DIAGNOSIS — M35.00 SJOGREN'S SYNDROME WITHOUT EXTRAGLANDULAR INVOLVEMENT (HCC): ICD-10-CM

## 2025-05-06 LAB
ALBUMIN SERPL-MCNC: 4.8 G/DL (ref 3.2–4.8)
ALBUMIN/GLOB SERPL: 1.5 {RATIO} (ref 1–2)
ALP LIVER SERPL-CCNC: 99 U/L (ref 45–117)
ALT SERPL-CCNC: 22 U/L (ref 10–49)
ANION GAP SERPL CALC-SCNC: 8 MMOL/L (ref 0–18)
AST SERPL-CCNC: 37 U/L (ref ?–34)
BASOPHILS # BLD AUTO: 0.02 X10(3) UL (ref 0–0.2)
BASOPHILS NFR BLD AUTO: 0.4 %
BILIRUB SERPL-MCNC: 0.4 MG/DL (ref 0.3–1.2)
BILIRUB UR QL STRIP.AUTO: NEGATIVE
BUN BLD-MCNC: 16 MG/DL (ref 9–23)
C3 SERPL-MCNC: 130 MG/DL (ref 90–170)
C4 SERPL-MCNC: 18.1 MG/DL (ref 12–36)
CALCIUM BLD-MCNC: 9.2 MG/DL (ref 8.7–10.6)
CHLORIDE SERPL-SCNC: 106 MMOL/L (ref 98–112)
CK SERPL-CCNC: 138 U/L (ref 46–171)
CLARITY UR REFRACT.AUTO: CLEAR
CO2 SERPL-SCNC: 27 MMOL/L (ref 21–32)
COLOR UR AUTO: YELLOW
CREAT BLD-MCNC: 1.19 MG/DL (ref 0.7–1.3)
CREAT UR-SCNC: 231.5 MG/DL
CRP SERPL-MCNC: <0.4 MG/DL (ref ?–0.5)
DEPRECATED HBV CORE AB SER IA-ACNC: 55 NG/ML (ref 50–336)
EGFRCR SERPLBLD CKD-EPI 2021: 70 ML/MIN/1.73M2 (ref 60–?)
EOSINOPHIL # BLD AUTO: 0.3 X10(3) UL (ref 0–0.7)
EOSINOPHIL NFR BLD AUTO: 6.4 %
ERYTHROCYTE [DISTWIDTH] IN BLOOD BY AUTOMATED COUNT: 11.4 %
ERYTHROCYTE [SEDIMENTATION RATE] IN BLOOD: 33 MM/HR (ref 0–20)
FASTING STATUS PATIENT QL REPORTED: YES
FOLATE SERPL-MCNC: 20.7 NG/ML (ref 5.4–?)
GLOBULIN PLAS-MCNC: 3.1 G/DL (ref 2–3.5)
GLUCOSE BLD-MCNC: 130 MG/DL (ref 70–99)
GLUCOSE UR STRIP.AUTO-MCNC: NORMAL MG/DL
HCT VFR BLD AUTO: 41.9 % (ref 39–53)
HGB BLD-MCNC: 14.5 G/DL (ref 13–17.5)
IMM GRANULOCYTES # BLD AUTO: 0.01 X10(3) UL (ref 0–1)
IMM GRANULOCYTES NFR BLD: 0.2 %
IRON SATN MFR SERPL: 14 % (ref 20–50)
IRON SERPL-MCNC: 41 UG/DL (ref 65–175)
KETONES UR STRIP.AUTO-MCNC: NEGATIVE MG/DL
LEUKOCYTE ESTERASE UR QL STRIP.AUTO: NEGATIVE
LYMPHOCYTES # BLD AUTO: 0.94 X10(3) UL (ref 1–4)
LYMPHOCYTES NFR BLD AUTO: 20.2 %
MAGNESIUM SERPL-MCNC: 1.9 MG/DL (ref 1.6–2.6)
MCH RBC QN AUTO: 30.9 PG (ref 26–34)
MCHC RBC AUTO-ENTMCNC: 34.6 G/DL (ref 31–37)
MCV RBC AUTO: 89.3 FL (ref 80–100)
MONOCYTES # BLD AUTO: 0.5 X10(3) UL (ref 0.1–1)
MONOCYTES NFR BLD AUTO: 10.7 %
NEUTROPHILS # BLD AUTO: 2.89 X10 (3) UL (ref 1.5–7.7)
NEUTROPHILS # BLD AUTO: 2.89 X10(3) UL (ref 1.5–7.7)
NEUTROPHILS NFR BLD AUTO: 62.1 %
NITRITE UR QL STRIP.AUTO: NEGATIVE
OSMOLALITY SERPL CALC.SUM OF ELEC: 295 MOSM/KG (ref 275–295)
PH UR STRIP.AUTO: 5.5 [PH] (ref 5–8)
PHOSPHATE SERPL-MCNC: 3.2 MG/DL (ref 2.4–5.1)
PLATELET # BLD AUTO: 192 10(3)UL (ref 150–450)
POTASSIUM SERPL-SCNC: 4.3 MMOL/L (ref 3.5–5.1)
PROT SERPL-MCNC: 7.9 G/DL (ref 5.7–8.2)
PROT UR STRIP.AUTO-MCNC: NEGATIVE MG/DL
PROT UR-MCNC: 19.2 MG/DL (ref ?–14)
RBC # BLD AUTO: 4.69 X10(6)UL (ref 4.3–5.7)
RBC UR QL AUTO: NEGATIVE
SODIUM SERPL-SCNC: 141 MMOL/L (ref 136–145)
SP GR UR STRIP.AUTO: >1.03 (ref 1–1.03)
TOTAL IRON BINDING CAPACITY: 301 UG/DL (ref 250–425)
TRANSFERRIN SERPL-MCNC: 231 MG/DL (ref 215–365)
TSI SER-ACNC: 2.71 UIU/ML (ref 0.55–4.78)
UROBILINOGEN UR STRIP.AUTO-MCNC: NORMAL MG/DL
VIT B12 SERPL-MCNC: 515 PG/ML (ref 211–911)
VIT D+METAB SERPL-MCNC: 66 NG/ML (ref 30–100)
WBC # BLD AUTO: 4.7 X10(3) UL (ref 4–11)

## 2025-05-06 PROCEDURE — 84100 ASSAY OF PHOSPHORUS: CPT

## 2025-05-06 PROCEDURE — 82746 ASSAY OF FOLIC ACID SERUM: CPT

## 2025-05-06 PROCEDURE — 84443 ASSAY THYROID STIM HORMONE: CPT

## 2025-05-06 PROCEDURE — 86334 IMMUNOFIX E-PHORESIS SERUM: CPT

## 2025-05-06 PROCEDURE — 82306 VITAMIN D 25 HYDROXY: CPT

## 2025-05-06 PROCEDURE — 86140 C-REACTIVE PROTEIN: CPT

## 2025-05-06 PROCEDURE — 81003 URINALYSIS AUTO W/O SCOPE: CPT

## 2025-05-06 PROCEDURE — 82728 ASSAY OF FERRITIN: CPT

## 2025-05-06 PROCEDURE — 83550 IRON BINDING TEST: CPT

## 2025-05-06 PROCEDURE — 86160 COMPLEMENT ANTIGEN: CPT

## 2025-05-06 PROCEDURE — 84156 ASSAY OF PROTEIN URINE: CPT

## 2025-05-06 PROCEDURE — 80053 COMPREHEN METABOLIC PANEL: CPT

## 2025-05-06 PROCEDURE — 85025 COMPLETE CBC W/AUTO DIFF WBC: CPT

## 2025-05-06 PROCEDURE — 84165 PROTEIN E-PHORESIS SERUM: CPT

## 2025-05-06 PROCEDURE — 36415 COLL VENOUS BLD VENIPUNCTURE: CPT

## 2025-05-06 PROCEDURE — 85652 RBC SED RATE AUTOMATED: CPT

## 2025-05-06 PROCEDURE — 82570 ASSAY OF URINE CREATININE: CPT

## 2025-05-06 PROCEDURE — 82607 VITAMIN B-12: CPT

## 2025-05-06 PROCEDURE — 83521 IG LIGHT CHAINS FREE EACH: CPT

## 2025-05-06 PROCEDURE — 83540 ASSAY OF IRON: CPT

## 2025-05-06 PROCEDURE — 82550 ASSAY OF CK (CPK): CPT

## 2025-05-06 PROCEDURE — 83735 ASSAY OF MAGNESIUM: CPT

## 2025-05-09 LAB
ALBUMIN SERPL ELPH-MCNC: 4.12 G/DL (ref 3.75–5.21)
ALBUMIN/GLOB SERPL: 1.34 {RATIO} (ref 1–2)
ALPHA1 GLOB SERPL ELPH-MCNC: 0.2 G/DL (ref 0.19–0.46)
ALPHA2 GLOB SERPL ELPH-MCNC: 0.78 G/DL (ref 0.48–1.05)
B-GLOBULIN SERPL ELPH-MCNC: 0.79 G/DL (ref 0.68–1.23)
GAMMA GLOB SERPL ELPH-MCNC: 1.31 G/DL (ref 0.62–1.7)
KAPPA LC FREE SER-MCNC: 3.71 MG/DL (ref 0.33–1.94)
KAPPA LC FREE/LAMBDA FREE SER NEPH: 1.57 {RATIO} (ref 0.26–1.65)
LAMBDA LC FREE SERPL-MCNC: 2.37 MG/DL (ref 0.57–2.63)
PROT SERPL-MCNC: 7.2 G/DL (ref 5.7–8.2)

## 2025-06-18 DIAGNOSIS — R45.89 ANXIETY ABOUT HEALTH: ICD-10-CM

## 2025-06-18 NOTE — TELEPHONE ENCOUNTER
Current Medications[1]     escitalopram 20 MG Oral Tab          [1]   Current Outpatient Medications   Medication Sig Dispense Refill    cyclobenzaprine 10 MG Oral Tab Take 1 tablet (10 mg total) by mouth nightly as needed for Muscle spasms. Do not take if you take ambien 90 tablet 0    NEBIVOLOL 2.5 MG Oral Tab TAKE 1 TABLET(2.5 MG) BY MOUTH DAILY 90 tablet 2    escitalopram 20 MG Oral Tab Take 1 tablet (20 mg total) by mouth daily. 90 tablet 2    Multiple Vitamins-Minerals (MULTIVITAMIN ADULT OR) Take 1 tablet by mouth daily.      Vitamin D3 2000 UNITS Oral Cap Take 1 capsule (2,000 Units total) by mouth daily.

## 2025-06-19 DIAGNOSIS — R45.89 ANXIETY ABOUT HEALTH: ICD-10-CM

## 2025-06-19 RX ORDER — ESCITALOPRAM OXALATE 20 MG/1
20 TABLET ORAL DAILY
Qty: 90 TABLET | Refills: 2 | Status: SHIPPED | OUTPATIENT
Start: 2025-06-19 | End: 2025-06-23

## 2025-06-19 RX ORDER — ESCITALOPRAM OXALATE 20 MG/1
20 TABLET ORAL DAILY
Qty: 90 TABLET | Refills: 3 | Status: SHIPPED | OUTPATIENT
Start: 2025-06-19

## 2025-06-19 NOTE — TELEPHONE ENCOUNTER
Patient calling again to follow up on pended medication          Dr. Tyrell YEPEZ (telemedicine) 5/1/25 - see pended medication refill - patient has called 3 times today

## 2025-06-19 NOTE — TELEPHONE ENCOUNTER
Patient called to follow up on refill request, patient states he has been out for 3 days    Dr. Santillan - see pended refill request

## 2025-06-23 RX ORDER — ESCITALOPRAM OXALATE 20 MG/1
20 TABLET ORAL DAILY
Qty: 90 TABLET | Refills: 2 | OUTPATIENT
Start: 2025-06-23

## 2025-06-23 NOTE — TELEPHONE ENCOUNTER
Outpatient Medication Detail     Disp Refills Start End    escitalopram 20 MG Oral Tab 90 tablet 3 6/19/2025 --    Sig - Route: Take 1 tablet (20 mg total) by mouth daily. - Oral    Sent to pharmacy as: Escitalopram Oxalate 20 MG Oral Tablet (Lexapro)    E-Prescribing Status: Receipt confirmed by pharmacy (6/19/2025  3:39 PM CDT)      Associated Diagnoses    Anxiety about health        Pharmacy    [id number] [address] AT [facility] [address], [phone], [phone]

## (undated) DIAGNOSIS — M35.00 SJOGREN'S SYNDROME WITHOUT EXTRAGLANDULAR INVOLVEMENT (HCC): Primary | ICD-10-CM

## (undated) DIAGNOSIS — R05.9 COUGH: ICD-10-CM

## (undated) DIAGNOSIS — M05.9 RHEUMATOID ARTHRITIS WITH POSITIVE RHEUMATOID FACTOR, INVOLVING UNSPECIFIED SITE (HCC): ICD-10-CM

## (undated) DIAGNOSIS — R05.3 PERSISTENT COUGH FOR 3 WEEKS OR LONGER: ICD-10-CM

## (undated) DIAGNOSIS — Z88.9 H/O SEASONAL ALLERGIES: ICD-10-CM

## (undated) DIAGNOSIS — M05.9 SEROPOSITIVE RHEUMATOID ARTHRITIS (HCC): Primary | ICD-10-CM

## (undated) DIAGNOSIS — F41.8 ANXIETY ABOUT HEALTH: ICD-10-CM

## (undated) NOTE — LETTER
01/10/19        [id number]      Dear Caitlyn Andres records indicate that you have outstanding lab work and or testing that was ordered for you and has not yet been completed:  Orders Placed This En

## (undated) NOTE — LETTER
6/21/2021        Referring:  Tatianna Sanchez MD  [id number] [facility],  [address] [place] [address] [place]      Dear Dr. Johnson Scales:    Thank you for referring your patient to me for an evaluation. Sjogren's xerostomia, arthralgia, and fatigue is active.  Will start cevimeline as More \"heat rashes\", but otherwise no rashes or photosensitive rashes. Hx Left parotid MALToma. On  08/18/2016 he underwent left superficial parotidectomy.  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 Neurologic: Strength, CN2-12 grossly intact   Psych: normal affect. Skin: No lesions or rashes. MSK: 28 joint count performed.  No evidence of synovitis in mcp, pip, dip, wrist, elbows, shou Results   Component Value Date     (H) 05/24/2021    ANTISSA 954 (H) 06/10/2014     (H) 05/24/2021    ANTISSB 1,258 (H) 06/10/2014     Lab Results   Component Value Date    DSDNA <100 12/01/2015    ANTIDSDNA 8 06/10/2014    SMUD <100 12/01/20 OTC eye drops: Systane, Refresh or Blink  -- for dry mouth, try biotene products (toothpaste, mouthwash, oral spray or lozenges)  -Failed pilocarpine: Start cevimeline 30 mg nightly with titration to 30 mg 3 times daily.  -Tried hydroxychloroquine in the p C-REACTIVE PROTEIN; Future  -     SED RATE, WESTERGREN (AUTOMATED);  Future    Screening for tuberculosis  -     QUANTIFERON TB GOLD (IN TUBE)    Screening for HIV (human immunodeficiency virus)  -     HIV AG AB COMBO; Future    Sjogren's syndrome without

## (undated) NOTE — MR AVS SNAPSHOT
[address]  [address]  [place] [address] [id number] [phone]               Thank you for choosing us for your health care visit with Nelida Fitzgerald PA-C.   We are glad to serve you and happy to provide you with this summar Take 1 tablet (10 mg total) by mouth daily. Commonly known as:  LEXAPRO           MULTIVITAMIN ADULT OR   Take 1 tablet by mouth daily. Vitamin D3 2000 units Caps   Take 2,000 Units by mouth daily.    Commonly known as:  VITAMIN D3

## (undated) NOTE — LETTER
5/1/2024  Horace Thomas [place]  [address] [facility] [address]    We take each of our patient's health very seriously and the key to maintaining your health is an annual wellness physical.  Review of your medical records shows that it is time for your annual wellness exam.  Please contact my office at your earliest convenience to schedule this appointment at [phone]  Thank You    Goyo Santillan MD

## (undated) NOTE — MR AVS SNAPSHOT
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

## (undated) NOTE — LETTER
Date: 6/26/2020    Patient Name: Joe Gottlieb          To Whom it may concern: This letter has been written at the patient's request. The above patient was seen at the [facility] for treatment of a medical condition.     This patient

## (undated) NOTE — Clinical Note
Call pt, give otrexup 25 mg samples, schedule teaching, start PA. Remind him to get labs in 4 weeks. Schedule RTC in [place] in 3 months.

## (undated) NOTE — MR AVS SNAPSHOT
[address]  [address]  [address] [place] [address] [address]  [phone]               Thank you for choosing us for your health care visit with Kodi Granado PA-C.   We are glad to serve you and happy to provide you with this summar Take 81 mg by mouth daily. BYSTOLIC 2.5 MG Tabs   Generic drug:  Nebivolol HCl   TAKE 1 TABLET BY MOUTH EVERY DAY           ClonazePAM 0.5 MG Tabs   Take 1 tablet (0.5 mg total) by mouth 2 (two) times daily.    What changed:    - how much to take If you've recently had a stay at the Hospital you can access your discharge instructions in [employer] by going to Visits < Admission Summaries.  If you've been to the Emergency Department or your doctor's office, you can view your past visit information in My

## (undated) NOTE — Clinical Note
George Gibson,    This mutual patient of ours, as you know, has a history of left parotid MALToma s/p resection in 8/2016. He did not require chemoXRT and he has had no disease recurrence.  His underlying Sjogren's/rheumatoid arthritis has progressed to being se

## (undated) NOTE — Clinical Note
I sent cevimeline to pt's pharmacy last visit, he said he never received it. Can you call pt's pharmacy and see if this medication needs a prior auth?

## (undated) NOTE — MR AVS SNAPSHOT
[address]  [address] [facility] [place] [address] [address]  [phone]               Thank you for choosing us for your health care visit with Hair Welsh PA-C.   We are glad to serve you and happy to provide you with this summar medications prescribed for you. Read the directions carefully, and ask your doctor or other care provider to review them with you. Where to Get Your Medications      These medications were sent to [facility]Y #[id number]- [address]  STAT numbers can create reimbursement difficulties for you.     Assoc Dx:  Screening for colon cancer [Z12.11]          MyChart     Visit [facility]  You can access your MyChart to more actively manage your health care and view more details from this visit by going

## (undated) NOTE — LETTER
2/7/2024  Horace Thomas [place]  [address] [facility] [address]    We take each of our patient's health very seriously and the key to maintaining your health is an annual wellness physical.  Review of your medical records shows that it is time for your annual wellness exam.  Please contact my office at your earliest convenience to schedule this appointment at [phone]  Thank You    Goyo Santillan MD

## (undated) NOTE — LETTER
Date: 4/28/2021    Patient Name: Elver Duncan          To Whom it may concern: The above patient was seen at the [facility] for treatment of a medical condition.     This patient should be allowed to wear a face shield due to his medi